# Patient Record
Sex: MALE | Race: WHITE | NOT HISPANIC OR LATINO | Employment: FULL TIME | URBAN - METROPOLITAN AREA
[De-identification: names, ages, dates, MRNs, and addresses within clinical notes are randomized per-mention and may not be internally consistent; named-entity substitution may affect disease eponyms.]

---

## 2018-03-30 ENCOUNTER — OFFICE VISIT (OUTPATIENT)
Dept: URGENT CARE | Facility: URGENT CARE | Age: 23
End: 2018-03-30
Payer: MEDICARE

## 2018-03-30 VITALS
BODY MASS INDEX: 29.64 KG/M2 | HEART RATE: 67 BPM | OXYGEN SATURATION: 100 % | DIASTOLIC BLOOD PRESSURE: 66 MMHG | TEMPERATURE: 96.5 F | SYSTOLIC BLOOD PRESSURE: 137 MMHG | RESPIRATION RATE: 18 BRPM | WEIGHT: 206.6 LBS

## 2018-03-30 DIAGNOSIS — R21 RASH AND NONSPECIFIC SKIN ERUPTION: Primary | ICD-10-CM

## 2018-03-30 PROCEDURE — 99203 OFFICE O/P NEW LOW 30 MIN: CPT | Performed by: NURSE PRACTITIONER

## 2018-03-30 NOTE — PATIENT INSTRUCTIONS
Hydrocortisone to area    Benadryl and/or Zyrtec as needed for itching    Follow up if symptoms do not improve or worsen.

## 2018-03-30 NOTE — PROGRESS NOTES
SUBJECTIVE:   Savage Roberts is a 23 year old male who presents to clinic today for the following health issues:    Rash  Onset: Today     Description:   Location: Arms and all over body   Character: raised, painful, burning, red  Itching (Pruritis): YES    Progression of Symptoms:  improving    Accompanying Signs & Symptoms:  Fever: no   Body aches or joint pain: no   Sore throat symptoms: no   Recent cold symptoms: YES- runny nose and dry throat last couple days     History:   Previous similar rash: no     Precipitating factors:   Exposure to similar rash: no   New exposures: None   Recent travel: no     Alleviating factors:  None     Therapies Tried and outcome: None     Eat at a diner this afternoon, rash on elbows started after that. Improving since then.  1 spot on abdomen, 1 on back and 1 on thigh-resolving    Problem list and histories reviewed & adjusted, as indicated.  Additional history: as documented    Patient Active Problem List   Diagnosis     Duane's syndrome     Past Surgical History:   Procedure Laterality Date     NISSEN FUNDOPLICATION  1/2010     RECESSION RESECTION (REPAIR STRABISMUS) BILATERAL Bilateral 1/20/2015    Procedure: RECESSION RESECTION (REPAIR STRABISMUS) BILATERAL;  Surgeon: Shaun Storey MD;  Location: [facility]     SEPTOPLASTY  12/2011     STRABISMUS SURGERY  6/27/13    RLRc 5 and LLRc 4mm ([facility])       Social History   Substance Use Topics     Smoking status: Passive Smoke Exposure - Never Smoker     Smokeless tobacco: Never Used     Alcohol use No     Family History   Problem Relation Age of Onset     Strabismus Maternal Grandmother      no sx, no glasses, no patching     Strabismus Other      strab sx, glasses/strab sx     Neurologic Disorder Mother      nystagmus, bipolar, narcolepsy         Current Outpatient Prescriptions   Medication Sig Dispense Refill     Acetaminophen (TYLENOL PO) Take 1,000 mg by mouth every 6 hours as needed for mild pain or fever        BusPIRone HCl (BUSPAR PO) Take 15 mg by mouth 2 times daily       Butalbital-Acetaminophen (PHRENILIN)  MG TABS Take 1 tablet by mouth May take 2 times daily as needed       Levothyroxine Sodium (LEVOTHROID PO) Take 88 mg by mouth daily       divalproex (DEPAKOTE ER) 500 MG 24 hr tablet Take 500 mg by mouth daily       PROPRANOLOL HCL PO Take 60 mg by mouth 3 times daily as needed Take 1/2 tablet 3 times daily       ARIPiprazole (ABILIFY PO) Take 20 mg by mouth daily        Allergies   Allergen Reactions     Dust Mites      Labs reviewed in EPIC    Reviewed and updated as needed this visit by clinical staff       Reviewed and updated as needed this visit by Provider         ROS:  Constitutional, HEENT, cardiovascular, pulmonary, gi and gu systems are negative, except as otherwise noted.    OBJECTIVE:     /66 (BP Location: Right arm, Cuff Size: Adult Large)  Pulse 67  Temp 96.5  F (35.8  C) (Tympanic)  Resp 18  Wt 206 lb 9.6 oz (93.7 kg)  SpO2 100%  BMI 29.64 kg/m2  Body mass index is 29.64 kg/(m^2).  GENERAL: healthy, alert and no distress  NECK: no adenopathy  RESP: lungs clear to auscultation - no rales, rhonchi or wheezes  CV: regular rate and rhythm, normal S1 S2, no S3 or S4, no murmur, click or rub  SKIN: mild erythema on left elbow and left medial thigh  PSYCH: mentation appears normal, affect normal/bright    Diagnostic Test Results:  none     ASSESSMENT/PLAN:     1. Rash and nonspecific skin eruption  No acute distress.  Symptoms improving/resolved.  Symptomatic care and follow up discussed.    Home care instructions were reviewed with the patient. The risks, benefits and treatment options of prescribed medications or other treatments have been discussed with the patient. The patient verbalized their understanding and should call or follow up if no improvement or if they develop further problems.    Patient Instructions   Hydrocortisone to area    Benadryl and/or Zyrtec as needed for  itching    Follow up if symptoms do not improve or worsen.        AUDELIA Vasquez [facility]

## 2018-03-30 NOTE — MR AVS SNAPSHOT
"              After Visit Summary   3/30/2018    Savage Roberts    MRN: [id number]           Patient Information     Date Of Birth          1995        Visit Information        Provider Department      3/30/2018 5:05 PM Teodora Martinez APRN CNP [facility]        Care Instructions    Hydrocortisone to area    Benadryl and/or Zyrtec as needed for itching    Follow up if symptoms do not improve or worsen.            Follow-ups after your visit        Who to contact     If you have questions or need follow up information about today's clinic visit or your schedule please contact [facility] directly at [phone].  Normal or non-critical lab and imaging results will be communicated to you by MyChart, letter or phone within 4 business days after the clinic has received the results. If you do not hear from us within 7 days, please contact the clinic through [employer] or phone. If you have a critical or abnormal lab result, we will notify you by phone as soon as possible.  Submit refill requests through [employer] or call your pharmacy and they will forward the refill request to us. Please allow 3 business days for your refill to be completed.          Additional Information About Your Visit        MyChart Information     [employer] lets you send messages to your doctor, view your test results, renew your prescriptions, schedule appointments and more. To sign up, go to [email or web address] . Click on \"Log in\" on the left side of the screen, which will take you to the Welcome page. Then click on \"Sign up Now\" on the right side of the page.     You will be asked to enter the access code listed below, as well as some personal information. Please follow the directions to create your username and password.     Your access code is: [email or web address]  Expires: 2018  5:29 PM     Your access code will  in 90 days. If you need help or a new code, " please call your [place] clinic or [phone].        Care EveryWhere ID     This is your Care EveryWhere ID. This could be used by other organizations to access your [place] medical records  [id number]        Your Vitals Were     Pulse Temperature Respirations Pulse Oximetry BMI (Body Mass Index)       67 96.5  F (35.8  C) (Tympanic) 18 100% 29.64 kg/m2        Blood Pressure from Last 3 Encounters:   03/30/18 137/66   01/20/15 109/52    Weight from Last 3 Encounters:   03/30/18 206 lb 9.6 oz (93.7 kg)   01/20/15 238 lb 5.1 oz (108.1 kg) (99 %)*     * Growth percentiles are based on CDC 2-20 Years data.              Today, you had the following     No orders found for display       Primary Care Provider Fax #    Physician No Ref-Primary [phone]       No address on file        Equal Access to Services     [facility]: Hadii sarabjit Rosales, waaxda leoncio, qaybta kaalmada diane, isaiah barahona . So [facility] [phone].    ATENCIÓN: Si habla español, tiene a addison disposición servicios gratuitos de asistencia lingüística. Llame al [phone].    We comply with applicable federal civil rights laws and [place] laws. We do not discriminate on the basis of race, color, national origin, age, disability, sex, sexual orientation, or gender identity.            Thank you!     Thank you for choosing [facility]  for your care. Our goal is always to provide you with excellent care. Hearing back from our patients is one way we can continue to improve our services. Please take a few minutes to complete the written survey that you may receive in the mail after your visit with us. Thank you!             Your Updated Medication List - Protect others around you: Learn how to safely use, store and throw away your medicines at www.disposemymeds.org.          This list is accurate as of 3/30/18  5:29 PM.  Always use your most recent med list.                    Brand Name Dispense Instructions for use Diagnosis    ABILIFY PO      Take 20 mg by mouth daily        BUSPAR PO      Take 15 mg by mouth 2 times daily        Butalbital-Acetaminophen  MG Tabs per tablet    PHRENILIN     Take 1 tablet by mouth May take 2 times daily as needed        divalproex sodium extended-release 500 MG 24 hr tablet    DEPAKOTE ER     Take 500 mg by mouth daily        HYDROcodone-acetaminophen 5-325 MG per tablet    NORCO    10 tablet    Take 1 tablet by mouth every 6 hours as needed for moderate to severe pain Please feel free to use this medication for eye pain. Feel free to supplement or transition to over the counter dosing of Motrin (or Ibuprofen). Do not drive while under the influence of this medication.    Duane's syndrome       LEVOTHROID PO      Take 88 mg by mouth daily        neomycin-polymyxin-dexamethasone [id number] Susp ophthalmic susp    MAXITROL    1 Bottle    Apply 1 drop to eye 2 times daily Use on surgical eye    Duane's syndrome       PROPRANOLOL HCL PO      Take 60 mg by mouth 3 times daily as needed Take 1/2 tablet 3 times daily        TYLENOL PO      Take 1,000 mg by mouth every 6 hours as needed for mild pain or fever

## 2018-03-30 NOTE — NURSING NOTE
"Chief Complaint   Patient presents with     Derm Problem       Initial /66 (BP Location: Right arm, Cuff Size: Adult Large)  Pulse 67  Temp 96.5  F (35.8  C) (Tympanic)  Resp 18  Wt 206 lb 9.6 oz (93.7 kg)  SpO2 100%  BMI 29.64 kg/m2 Estimated body mass index is 29.64 kg/(m^2) as calculated from the following:    Height as of 1/20/15: 5' 10\" (1.778 m).    Weight as of this encounter: 206 lb 9.6 oz (93.7 kg).      Health Maintenance that is potentially due pending provider review:  NONE    n/a    Is there anyone who you would like to be able to receive your results? Not Applicable  If yes have patient fill out PETRA    Avinash Barrera M.A.      "

## 2018-09-18 ENCOUNTER — HOSPITAL ENCOUNTER (EMERGENCY)
Facility: CLINIC | Age: 23
Discharge: HOME | End: 2018-09-18
Attending: PHYSICIAN ASSISTANT | Admitting: PHYSICIAN ASSISTANT
Payer: MEDICARE

## 2018-09-18 ENCOUNTER — APPOINTMENT (OUTPATIENT)
Dept: GENERAL RADIOLOGY | Facility: CLINIC | Age: 23
End: 2018-09-18
Attending: PHYSICIAN ASSISTANT
Payer: MEDICARE

## 2018-09-18 VITALS
DIASTOLIC BLOOD PRESSURE: 85 MMHG | RESPIRATION RATE: 16 BRPM | TEMPERATURE: 98.8 F | BODY MASS INDEX: 28.7 KG/M2 | SYSTOLIC BLOOD PRESSURE: 142 MMHG | OXYGEN SATURATION: 98 % | HEART RATE: 69 BPM | WEIGHT: 200 LBS

## 2018-09-18 DIAGNOSIS — K59.00 CONSTIPATION, UNSPECIFIED CONSTIPATION TYPE: ICD-10-CM

## 2018-09-18 DIAGNOSIS — R07.81 RIB PAIN ON LEFT SIDE: ICD-10-CM

## 2018-09-18 PROCEDURE — 71101 X-RAY EXAM UNILAT RIBS/CHEST: CPT | Mod: LT

## 2018-09-18 PROCEDURE — 99214 OFFICE O/P EST MOD 30 MIN: CPT | Mod: Z6 | Performed by: PHYSICIAN ASSISTANT

## 2018-09-18 PROCEDURE — G0463 HOSPITAL OUTPT CLINIC VISIT: HCPCS | Mod: 25 | Performed by: PHYSICIAN ASSISTANT

## 2018-09-18 PROCEDURE — 72070 X-RAY EXAM THORAC SPINE 2VWS: CPT

## 2018-09-18 RX ORDER — CYCLOBENZAPRINE HCL 10 MG
10 TABLET ORAL 2 TIMES DAILY PRN
Qty: 12 TABLET | Refills: 0 | Status: SHIPPED | OUTPATIENT
Start: 2018-09-18 | End: 2019-09-10

## 2018-09-18 ASSESSMENT — ENCOUNTER SYMPTOMS
BLOOD IN STOOL: 0
SHORTNESS OF BREATH: 0
PALPITATIONS: 0
DIARRHEA: 0
WEAKNESS: 0
HEADACHES: 0
FEVER: 0
WHEEZING: 0
COLOR CHANGE: 0
WOUND: 0
NUMBNESS: 0
COUGH: 0
FATIGUE: 0
ABDOMINAL PAIN: 0
CONSTIPATION: 1
BACK PAIN: 1
VOMITING: 0
NAUSEA: 0

## 2018-09-18 NOTE — ED AVS SNAPSHOT
[facility]    [address] [facility] [address]    Phone:  [phone]    Fax:  [phone]                                       Savage Roberts   MRN: [id number]    Department:  [facility]   Date of Visit:  9/18/2018           After Visit Summary Signature Page     I have received my discharge instructions, and my questions have been answered. I have discussed any challenges I see with this plan with the nurse or doctor.    ..........................................................................................................................................  Patient/Patient Representative Signature      ..........................................................................................................................................  Patient Representative Print Name and Relationship to Patient    ..................................................               ................................................  Date                                   Time    ..........................................................................................................................................  Reviewed by Signature/Title    ...................................................              ..............................................  Date                                               Time          22EPIC Rev 08/18
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
## 2018-09-18 NOTE — DISCHARGE INSTRUCTIONS
Patient to use heat/ice, rest, tylenol/ibuprofen over the counter as needed for pain.     No heavy lifting for next few days.  Patient to take 1 capful of miralax daily for next several days until bowel movements return to normal.     Patient to increase fluids and fiber.     Patient to return to Emergency Room if shortness of breath, chest pain, abdominal pain, nausea/vomiting occur.
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
## 2018-11-30 ENCOUNTER — RADIANT APPOINTMENT (OUTPATIENT)
Dept: GENERAL RADIOLOGY | Facility: CLINIC | Age: 23
End: 2018-11-30
Attending: PHYSICIAN ASSISTANT
Payer: MEDICARE

## 2018-11-30 ENCOUNTER — OFFICE VISIT (OUTPATIENT)
Dept: FAMILY MEDICINE | Facility: CLINIC | Age: 23
End: 2018-11-30
Payer: MEDICARE

## 2018-11-30 VITALS
HEIGHT: 70 IN | HEART RATE: 75 BPM | WEIGHT: 217 LBS | RESPIRATION RATE: 16 BRPM | DIASTOLIC BLOOD PRESSURE: 79 MMHG | BODY MASS INDEX: 31.07 KG/M2 | SYSTOLIC BLOOD PRESSURE: 132 MMHG | TEMPERATURE: 98 F

## 2018-11-30 DIAGNOSIS — Z23 NEED FOR PROPHYLACTIC VACCINATION AND INOCULATION AGAINST INFLUENZA: ICD-10-CM

## 2018-11-30 DIAGNOSIS — S42.102D CLOSED FRACTURE OF LEFT SCAPULA WITH ROUTINE HEALING, UNSPECIFIED PART OF SCAPULA, SUBSEQUENT ENCOUNTER: Primary | ICD-10-CM

## 2018-11-30 DIAGNOSIS — Z23 NEED FOR TDAP VACCINATION: ICD-10-CM

## 2018-11-30 DIAGNOSIS — Z71.85 VACCINE COUNSELING: ICD-10-CM

## 2018-11-30 DIAGNOSIS — S42.102D CLOSED FRACTURE OF LEFT SCAPULA WITH ROUTINE HEALING, UNSPECIFIED PART OF SCAPULA, SUBSEQUENT ENCOUNTER: ICD-10-CM

## 2018-11-30 PROCEDURE — 90715 TDAP VACCINE 7 YRS/> IM: CPT | Performed by: PHYSICIAN ASSISTANT

## 2018-11-30 PROCEDURE — 90471 IMMUNIZATION ADMIN: CPT | Performed by: PHYSICIAN ASSISTANT

## 2018-11-30 PROCEDURE — 73000 X-RAY EXAM OF COLLAR BONE: CPT | Mod: LT

## 2018-11-30 PROCEDURE — 90686 IIV4 VACC NO PRSV 0.5 ML IM: CPT | Performed by: PHYSICIAN ASSISTANT

## 2018-11-30 PROCEDURE — 99213 OFFICE O/P EST LOW 20 MIN: CPT | Mod: 25 | Performed by: PHYSICIAN ASSISTANT

## 2018-11-30 PROCEDURE — G0008 ADMIN INFLUENZA VIRUS VAC: HCPCS | Mod: 59 | Performed by: PHYSICIAN ASSISTANT

## 2018-11-30 NOTE — PROGRESS NOTES
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
## 2018-11-30 NOTE — NURSING NOTE
"Chief Complaint   Patient presents with     Musculoskeletal Problem       Initial /79 (BP Location: Right arm, Patient Position: Chair, Cuff Size: Adult Large)  Pulse 75  Temp 98  F (36.7  C) (Tympanic)  Resp 16  Ht 5' 10\" (1.778 m)  Wt 217 lb (98.4 kg)  BMI 31.14 kg/m2 Estimated body mass index is 31.14 kg/(m^2) as calculated from the following:    Height as of this encounter: 5' 10\" (1.778 m).    Weight as of this encounter: 217 lb (98.4 kg).    Patient presents to the clinic using No DME    Health Maintenance that is potentially due pending provider review:          Is there anyone who you would like to be able to receive your results? No  If yes have patient fill out PETRA      "

## 2018-11-30 NOTE — PATIENT INSTRUCTIONS
Xray today     Get flu and tetanus shot today     Decided to think about gardasil (HPV) vaccine    Gardasil (Human papilloma virus vaccine) - to protect against sexually transmitted virus which causes warts and cancers of vagina, cervix, penis, anus, and head/neck.      Facts about HPV:  Every year in the [place], HPV causes:   -11,500 cases cervical cancer, 80% of which would have been prevented by the vaccine  - 7,400 cases mouth/throat cancer, 95% of which would have been prevented by the vaccine  -400 cases penile caner, 75% of which would have been prevented by the vaccine    The vaccine has received HPV vaccine-type infection rates by 56% since the vaccine was first started.    Studies show vaccine does not influence teens to have sex earlier.  Still, half of teens start having sex by age 15.  Vaccine can only protect from future (not past) infections, therefore ideally needs to be given before sexual activity.  Vaccine protection lasts a lifetime.    Additionally:  Immune system response is better in pre-teens than in older teens.  If started at later age, vaccine requires an additional dose (3 rather than 2 doses).  We don't see teens often enough to guarantee completing the series if we do not start vaccine right away.        And call the clinic if you have questions

## 2018-11-30 NOTE — MR AVS SNAPSHOT
After Visit Summary   11/30/2018    Savage Roberts    MRN: [id number]           Patient Information     Date Of Birth          1995        Visit Information        Provider Department      11/30/2018 2:40 PM Valarie Claudio PA-C [facility]        Today's Diagnoses     Closed fracture of left scapula with routine healing, unspecified part of scapula, subsequent encounter    -  1    Vaccine counseling          Care Instructions    Xray today     Get flu and tetanus shot today     Decided to think about gardasil (HPV) vaccine    Menactra - to protect against bacterial meningitis - deadly and can kill within 24 hours.  Recommendations are for vaccine at age 11-12 and then a booster at/after age 16.  Can be given up to age 21.  Highest risk for meningitis is in teen and later teen years.  Symptoms of meningitis include severe headache, neck stiffness, fever, and altered mental function.  Even if vaccinated, always be seen immediately if these symptoms are present.      Gardasil (Human papilloma virus vaccine) - to protect against sexually transmitted virus which causes warts and cancers of vagina, cervix, penis, anus, and head/neck.      Facts about HPV:  Every year in the [place], HPV causes:   -11,500 cases cervical cancer, 80% of which would have been prevented by the vaccine  - 7,400 cases mouth/throat cancer, 95% of which would have been prevented by the vaccine  -400 cases penile caner, 75% of which would have been prevented by the vaccine    The vaccine has received HPV vaccine-type infection rates by 56% since the vaccine was first started.    Studies show vaccine does not influence teens to have sex earlier.  Still, half of teens start having sex by age 15.  Vaccine can only protect from future (not past) infections, therefore ideally needs to be given before sexual activity.  Vaccine protection lasts a lifetime.    Additionally:  Immune system response is better  "in pre-teens than in older teens.  If started at later age, vaccine requires an additional dose (3 rather than 2 doses).  We don't see teens often enough to guarantee completing the series if we do not start vaccine right away.        And call the clinic if you have questions              Follow-ups after your visit        Future tests that were ordered for you today     Open Future Orders        Priority Expected Expires Ordered    XR Scapula Left Routine 11/30/2018 11/30/2019 11/30/2018            Who to contact     If you have questions or need follow up information about today's clinic visit or your schedule please contact [facility] directly at [phone].  Normal or non-critical lab and imaging results will be communicated to you by MyChart, letter or phone within 4 business days after the clinic has received the results. If you do not hear from us within 7 days, please contact the clinic through MyChart or phone. If you have a critical or abnormal lab result, we will notify you by phone as soon as possible.  Submit refill requests through [employer] or call your pharmacy and they will forward the refill request to us. Please allow 3 business days for your refill to be completed.          Additional Information About Your Visit        Care EveryWhere ID     This is your Care EveryWhere ID. This could be used by other organizations to access your [facility] medical records  [id number]        Your Vitals Were     Pulse Temperature Respirations Height BMI (Body Mass Index)       75 98  F (36.7  C) (Tympanic) 16 5' 10\" (1.778 m) 31.14 kg/m2        Blood Pressure from Last 3 Encounters:   11/30/18 132/79   09/18/18 142/85   03/30/18 137/66    Weight from Last 3 Encounters:   11/30/18 217 lb (98.4 kg)   09/18/18 200 lb (90.7 kg)   03/30/18 206 lb 9.6 oz (93.7 kg)               Primary Care Provider Fax #    Physician No Ref-Primary [phone]       No address on file        Equal Access to " Services     [facility]: Hadii sarabjit Rosales, waerrolda luqadaha, qaybta kaalmateddy contreras, isaiah nesbitt. So [facility] [phone].    ATENCIÓN: Si habla español, tiene a addison disposición servicios gratuitos de asistencia lingüística. Llame al [phone].    We comply with applicable federal civil rights laws and [place] laws. We do not discriminate on the basis of race, color, national origin, age, disability, sex, sexual orientation, or gender identity.            Thank you!     Thank you for choosing [facility]  for your care. Our goal is always to provide you with excellent care. Hearing back from our patients is one way we can continue to improve our services. Please take a few minutes to complete the written survey that you may receive in the mail after your visit with us. Thank you!             Your Updated Medication List - Protect others around you: Learn how to safely use, store and throw away your medicines at www.disposemymeds.org.          This list is accurate as of 11/30/18  4:03 PM.  Always use your most recent med list.                   Brand Name Dispense Instructions for use Diagnosis    cyclobenzaprine 10 MG tablet    FLEXERIL    12 tablet    Take 1 tablet (10 mg) by mouth 2 times daily as needed for muscle spasms

## 2019-09-10 ENCOUNTER — OFFICE VISIT (OUTPATIENT)
Dept: FAMILY MEDICINE | Facility: CLINIC | Age: 24
End: 2019-09-10
Payer: MEDICARE

## 2019-09-10 VITALS
OXYGEN SATURATION: 98 % | HEART RATE: 64 BPM | HEIGHT: 71 IN | TEMPERATURE: 98.6 F | RESPIRATION RATE: 14 BRPM | BODY MASS INDEX: 31.02 KG/M2 | WEIGHT: 221.6 LBS | SYSTOLIC BLOOD PRESSURE: 128 MMHG | DIASTOLIC BLOOD PRESSURE: 76 MMHG

## 2019-09-10 DIAGNOSIS — R00.2 PALPITATIONS: ICD-10-CM

## 2019-09-10 DIAGNOSIS — R40.4 TRANSIENT ALTERATION OF AWARENESS: Primary | ICD-10-CM

## 2019-09-10 DIAGNOSIS — E66.9 NON MORBID OBESITY, UNSPECIFIED OBESITY TYPE: ICD-10-CM

## 2019-09-10 DIAGNOSIS — R71.8 MICROCYTOSIS: ICD-10-CM

## 2019-09-10 DIAGNOSIS — R71.8 LOW MEAN CORPUSCULAR VOLUME (MCV): ICD-10-CM

## 2019-09-10 DIAGNOSIS — Z87.820 HISTORY OF MULTIPLE CONCUSSIONS: ICD-10-CM

## 2019-09-10 LAB
ALBUMIN SERPL-MCNC: 4 G/DL (ref 3.4–5)
ALP SERPL-CCNC: 75 U/L (ref 40–150)
ALT SERPL W P-5'-P-CCNC: 42 U/L (ref 0–70)
ANION GAP SERPL CALCULATED.3IONS-SCNC: 6 MMOL/L (ref 3–14)
AST SERPL W P-5'-P-CCNC: 24 U/L (ref 0–45)
BASOPHILS # BLD AUTO: 0 10E9/L (ref 0–0.2)
BASOPHILS NFR BLD AUTO: 0.5 %
BILIRUB SERPL-MCNC: 0.4 MG/DL (ref 0.2–1.3)
BUN SERPL-MCNC: 11 MG/DL (ref 7–30)
CALCIUM SERPL-MCNC: 8.7 MG/DL (ref 8.5–10.1)
CHLORIDE SERPL-SCNC: 105 MMOL/L (ref 94–109)
CO2 SERPL-SCNC: 26 MMOL/L (ref 20–32)
CREAT SERPL-MCNC: 0.92 MG/DL (ref 0.66–1.25)
CRP SERPL-MCNC: <2.9 MG/L (ref 0–8)
DIFFERENTIAL METHOD BLD: ABNORMAL
EOSINOPHIL # BLD AUTO: 0 10E9/L (ref 0–0.7)
EOSINOPHIL NFR BLD AUTO: 0.2 %
ERYTHROCYTE [DISTWIDTH] IN BLOOD BY AUTOMATED COUNT: 13 % (ref 10–15)
ERYTHROCYTE [SEDIMENTATION RATE] IN BLOOD BY WESTERGREN METHOD: 5 MM/H (ref 0–15)
GFR SERPL CREATININE-BSD FRML MDRD: >90 ML/MIN/{1.73_M2}
GLUCOSE SERPL-MCNC: 85 MG/DL (ref 70–99)
HCT VFR BLD AUTO: 42.6 % (ref 40–53)
HGB BLD-MCNC: 14.9 G/DL (ref 13.3–17.7)
LYMPHOCYTES # BLD AUTO: 1.5 10E9/L (ref 0.8–5.3)
LYMPHOCYTES NFR BLD AUTO: 35 %
MCH RBC QN AUTO: 26.6 PG (ref 26.5–33)
MCHC RBC AUTO-ENTMCNC: 35 G/DL (ref 31.5–36.5)
MCV RBC AUTO: 76 FL (ref 78–100)
MONOCYTES # BLD AUTO: 0.4 10E9/L (ref 0–1.3)
MONOCYTES NFR BLD AUTO: 8.2 %
NEUTROPHILS # BLD AUTO: 2.5 10E9/L (ref 1.6–8.3)
NEUTROPHILS NFR BLD AUTO: 56.1 %
PLATELET # BLD AUTO: 210 10E9/L (ref 150–450)
POTASSIUM SERPL-SCNC: 3.9 MMOL/L (ref 3.4–5.3)
PROT SERPL-MCNC: 7.5 G/DL (ref 6.8–8.8)
RBC # BLD AUTO: 5.61 10E12/L (ref 4.4–5.9)
SODIUM SERPL-SCNC: 137 MMOL/L (ref 133–144)
TSH SERPL DL<=0.005 MIU/L-ACNC: 2.25 MU/L (ref 0.4–4)
VIT B12 SERPL-MCNC: 445 PG/ML (ref 193–986)
WBC # BLD AUTO: 4.4 10E9/L (ref 4–11)

## 2019-09-10 PROCEDURE — 86140 C-REACTIVE PROTEIN: CPT | Performed by: FAMILY MEDICINE

## 2019-09-10 PROCEDURE — 85025 COMPLETE CBC W/AUTO DIFF WBC: CPT | Performed by: FAMILY MEDICINE

## 2019-09-10 PROCEDURE — 80053 COMPREHEN METABOLIC PANEL: CPT | Performed by: FAMILY MEDICINE

## 2019-09-10 PROCEDURE — 84443 ASSAY THYROID STIM HORMONE: CPT | Performed by: FAMILY MEDICINE

## 2019-09-10 PROCEDURE — 99214 OFFICE O/P EST MOD 30 MIN: CPT | Performed by: FAMILY MEDICINE

## 2019-09-10 PROCEDURE — 85652 RBC SED RATE AUTOMATED: CPT | Performed by: FAMILY MEDICINE

## 2019-09-10 PROCEDURE — 82607 VITAMIN B-12: CPT | Performed by: FAMILY MEDICINE

## 2019-09-10 PROCEDURE — 36415 COLL VENOUS BLD VENIPUNCTURE: CPT | Performed by: FAMILY MEDICINE

## 2019-09-10 PROCEDURE — 93000 ELECTROCARDIOGRAM COMPLETE: CPT | Performed by: FAMILY MEDICINE

## 2019-09-10 ASSESSMENT — MIFFLIN-ST. JEOR: SCORE: 2013.33

## 2019-09-10 NOTE — PATIENT INSTRUCTIONS
"Contact Cardiac Services  at [phone], to schedule the zio patch placement appointment.    You will be contacted in 1-2 days for results of your lab tests.    To schedule the MRI of the brain.    Schedule neurology consult.  Patient Education     Heart Palpitations    Palpitations are the feeling that your heart is beating hard, fast, or irregular. Some describe it as \"pounding\" or \"skipped beats.\" Palpitations may occur in someone with heart disease, but can also occur in a healthy person.  Heart-related causes:    Arrhythmia (a change from the heart's normal rhythm)    Heart valve disease    Disease of the heart muscle    Coronary artery disease    High blood pressure  Non-heart-related causes:    Certain medicines such as asthma inhalers and decongestants    Some herbal supplements, energy drinks and pills, and weight loss pills    Illegal stimulant drugs such as cocaine, crank, methamphetamine, PCP, bath salts, or ecstasy    Caffeine, alcohol, and tobacco    Medical conditions such as thyroid disease, anemia, anxiety, and panic disorder  Sometimes the cause can't be found.  Home care  Follow these home care tips:    Don't use too much caffeine, alcohol, tobacco, or any stimulant drugs.    Tell your doctor about any prescription or over-the-counter or herbal medicines you take.  Follow-up care    Follow up with your doctor, or as advised.  Call 911  This is the fastest and safest way to get to the emergency department. The paramedics can also begin treatment on the way to the hospital, if needed.  Don't wait until your symptoms are severe to call 911. These are reasons to call 911:    Chest pain    Shortness of breath    Feeling lightheaded, faint, or dizzy    Fainting or loss of consciousness    Very irregular heartbeat    Rapid heartbeat that makes you uncomfortable    Slower than usual heart rate associated with symptoms    Slower than usual heart rate    Chest pain with weakness, dizziness, heavy " sweating, nausea, or vomiting    Extreme drowsiness or confusion    Weakness of an arm or leg, or on 1 side of the face    Difficulty with speech or vision  When to seek medical advice  Call your healthcare provider right away if you have palpitations and any of the following:    Weakness    Dizziness    Lightheadedness    Fainting  Date Last Reviewed: 4/27/2016 2000-2018 [employer]. [address] [place] [address]. All rights reserved. This information is not intended as a substitute for professional medical care. Always follow your healthcare professional's instructions.           Patient Education     Altered Level of Consciousness  Level of consciousness (LOC) is a measure of a person s ability to interact with other people and to react to what is around them. A person with an altered level of consciousness may not respond to touch or voices. They may look vacant or blank. They may not make eye contact with others. The person may be limp and may not move for a long time. Or they may show little interest in moving. He or she may also be confused.  There are many causes of altered LOC. They include low blood sugar, infection, medicines, injuries, seizures, stroke, being drunk or other health problems.  Altered LOC is a medical emergency. The healthcare provider will do tests to help find the cause. These may include blood tests and imaging tests. The person is treated so breathing and heart rate are stable. An An IV (intravenous) line may be put into a vein in the arm or hand to give medicines. Once the cause of altered LOC is found, the goal is to treat the cause.  In almost all cases, the person will be admitted to the hospital for diagnostic testing and observation.  Some conditions, such as locked-in syndrome and akinetic mutism, seem like a coma. But the person is perfectly awake.  Home care  When your loved one is released from the hospital, you will be given guidelines for caring  for him or her. In general:    Follow the healthcare provider's instructions for giving any prescribed medicines to your child.    Stay with your loved one or have another responsible adult look after him or her. Watch carefully for any return of symptoms or changes in behavior.    If the person has diabetes, make sure that any approved medicines are given on time and as prescribed.  Follow-up care  Follow up with your healthcare provider, or our staff as advised.  When to seek medical advice  Call your healthcare provider right away if new symptoms appear.  Call 911  Call 911 or get medical care right away Iif symptoms of altered LOC return.  Date Last Reviewed: 6/1/2018 2000-2018 [employer]. [address] [place] [address], [place] [address]. All rights reserved. This information is not intended as a substitute for professional medical care. Always follow your healthcare professional's instructions.

## 2019-09-10 NOTE — PROGRESS NOTES
Subjective     Savage Roberts is a 24 year old male who presents to clinic today for the following health issues:  Chief Complaint   Patient presents with     Establish Care     Dizziness     Pt also having random shakiness and dizziness, would like to have some labs done for hypoglycemia.     Blood Draw     Pt is fasting for lab work.       HPI   Dizziness, shakiness  Onset: pt remembers all through high school through current, worse past couple months    Description:   Do you feel faint:  YES- feels lightheaded like he might if he does not lay down   Does it feel like the surroundings (bed, room) are moving: sometimes but not always  Unsteady/off balance: YES  Have you passed out or fallen: no     Intensity: mild    Progression of Symptoms:  worsening, happens daily recently, 1 episode per day, closer to evening    Accompanying Signs & Symptoms:  Heart palpitations: YES- sometimes irregular and racing   Nausea, vomiting: yes, when headache occurs   Weakness in arms or legs: YES- weakness in arms, shaking in arms  Fatigue: YES  Vision or speech changes: YES- speech slows down, vision is effected when he gets a headache which sometimes happens when these symptoms occur  Ringing in ears (Tinnitus): YES- pt has tinnitus  Hearing Loss: no     History:   Head trauma/concussion hx: YES- 3 concussions, most recent 5/2018, (4 [employer] accident-was unconscious for 3 days) collarbone fractured-had surgery for that  Previous similar symptoms: YES- ongoing since high school  Recent bleeding history: no     Precipitating factors:   Worse with activity or head movement: unsure   Any new medications (BP?): no   Alcohol/drug abuse/withdrawal: no     Alleviating factors:   Does staying in a fixed position give relief:  no     Therapies Tried and outcome: none    Verified above history with patient.  Patient is worried he has diabetes because he said he found his symptoms on a Google search.    When asked further patient  "describes his symptoms as more of spacing out for several seconds (\"like my mind I shere and my body is separate\"), feeling he has no control of either arm with each episode, and he feels a headache after the episode.  Patient reports intermittent irregular pulse as well every day the last 7 days.    Denies any of these symptoms currently occurring at visit.    Patient recalls when he was a child, he was brought to heart and neurology specialists for possible seizures and \"heart problems\" but no abnormality he can recall. Patient is not on any chronic medications.    Patient denies actual LOC. Patient denies MVA due to the above.    Patient Active Problem List   Diagnosis     Duane's syndrome     Past Surgical History:   Procedure Laterality Date     NISSEN FUNDOPLICATION  1/2010     RECESSION RESECTION (REPAIR STRABISMUS) BILATERAL Bilateral 1/20/2015    Procedure: RECESSION RESECTION (REPAIR STRABISMUS) BILATERAL;  Surgeon: Shaun Storey MD;  Location: [facility]     SEPTOPLASTY  12/2011     STRABISMUS SURGERY  6/27/13    RLRc 5 and LLRc 4mm (Daniel)       Social History     Tobacco Use     Smoking status: Never Smoker     Smokeless tobacco: Never Used   Substance Use Topics     Alcohol use: No     Family History   Problem Relation Age of Onset     Strabismus Maternal Grandmother         no sx, no glasses, no patching     Strabismus Other         strab sx, glasses/strab sx     Neurologic Disorder Mother         nystagmus, bipolar, narcolepsy     Multiple Sclerosis Maternal Grandfather      Multiple Sclerosis Maternal Uncle          No current outpatient medications on file.     Allergies   Allergen Reactions     Dust Mites        Reviewed and updated as needed this visit by Provider  Tobacco  Allergies  Meds  Problems  Med Hx  Surg Hx  Fam Hx         Review of Systems   ROS COMP: Constitutional, HEENT, cardiovascular, pulmonary, GI, , musculoskeletal, neuro, skin, endocrine and psych systems are negative, " "except as otherwise noted.      Objective    /76   Pulse 64   Temp 98.6  F (37  C) (Tympanic)   Resp 14   Ht 1.797 m (5' 10.75\")   Wt 100.5 kg (221 lb 9.6 oz)   SpO2 98%   BMI 31.13 kg/m    Body mass index is 31.13 kg/m .  Physical Exam   GENERAL APPEARANCE: obese, alert and no distress  EYES: pink conj, no icterus, PERRL, EOMI  HENT: ear canals and TM's normal, nose and mouth without ulcers or lesions, no pharyngeal erythema but with grade 3 hypertrophic tonsils/adenoids bilaterally and oral mucous membranes moist  NECK: no adenopathy, no asymmetry, masses, or scars and thyroid normal to palpation  RESP: lungs clear to auscultation - no rales, rhonchi or wheezes  CV: regular rates and rhythm, normal S1 S2, no S3 or S4, no murmur, click or rub, no peripheral edema and peripheral pulses strong  ABDOMEN: soft, nontender, no hepatosplenomegaly, no masses and bowel sounds normal  MS: no musculoskeletal defects are noted and gait is age appropriate without ataxia  SKIN: no suspicious lesions or rashes  NEURO: Patient is A and O X 3; Cranial nerves 2-12 intact;  Strength 5/5 all extremities; DTR: ++ x 4; Romberg negative, able to tandem walk; Sensory grossly no deficit; no tremors No problems with motor coordination    Diagnostic Test Results:  EKG Today showed sinus bradycardia with no acute ischemic changes or premature beats        Assessment & Plan     Savage was seen today for establish care, dizziness and blood draw.    Diagnoses and all orders for this visit:    Transient alteration of awareness  -     CBC with platelets differential  -     Comprehensive metabolic panel  -     Erythrocyte sedimentation rate auto  -     Vitamin B12  -     CRP inflammation  -     MR Brain w/o Contrast; Future  -     Zio Patch Holter Adult Pediatric Greater than 48 hrs; Future  -     NEUROLOGY ADULT REFERRAL  Differentials are vast: partial/atypical/absence seizures, presyncope, cardiac dysrhythmias, intracranial " "masses, meningiomas, metabolic, postconcussion syndrome.  Discussed work up with patient. He would like to pursue more extensive work up.  Neuro consult ordered as well as possible seizure disorder - will likely need EEG if highly suspect for seizures, but will defer to neuro.    Palpitations  -     CBC with platelets differential  -     Comprehensive metabolic panel  -     TSH with free T4 reflex  -     EKG 12-lead complete w/read - Clinics  -     Zio Patch Holter Adult Pediatric Greater than 48 hrs; Future  Various possible causes. Will order work up as above. Patient concurs.  Advised to avoid or minimize caffeine.  Advised EKG with no dysrhythmia today.  Advised to work on stopping smoking.    History of multiple concussions  -     MR Brain w/o Contrast; Future  -     NEUROLOGY ADULT REFERRAL  See above.    Non morbid obesity, unspecified obesity type  Discussed risks of obesity: heart disease, stroke, end-organ damage,  DM, decreased quality of life  Counselled on diet, exercise and weight loss regimen.  Ordered CMP to screen glucose.       BMI:   Estimated body mass index is 31.13 kg/m  as calculated from the following:    Height as of this encounter: 1.797 m (5' 10.75\").    Weight as of this encounter: 100.5 kg (221 lb 9.6 oz).   Weight management plan: Discussed healthy diet and exercise guidelines        Patient Instructions   Contact Cardiac Services  at [phone], to schedule the zio patch placement appointment.    You will be contacted in 1-2 days for results of your lab tests.    To schedule the MRI of the brain.    Schedule neurology consult.  Patient Education     Heart Palpitations    Palpitations are the feeling that your heart is beating hard, fast, or irregular. Some describe it as \"pounding\" or \"skipped beats.\" Palpitations may occur in someone with heart disease, but can also occur in a healthy person.  Heart-related causes:    Arrhythmia (a change from the heart's normal " rhythm)    Heart valve disease    Disease of the heart muscle    Coronary artery disease    High blood pressure  Non-heart-related causes:    Certain medicines such as asthma inhalers and decongestants    Some herbal supplements, energy drinks and pills, and weight loss pills    Illegal stimulant drugs such as cocaine, crank, methamphetamine, PCP, bath salts, or ecstasy    Caffeine, alcohol, and tobacco    Medical conditions such as thyroid disease, anemia, anxiety, and panic disorder  Sometimes the cause can't be found.  Home care  Follow these home care tips:    Don't use too much caffeine, alcohol, tobacco, or any stimulant drugs.    Tell your doctor about any prescription or over-the-counter or herbal medicines you take.  Follow-up care    Follow up with your doctor, or as advised.  Call 911  This is the fastest and safest way to get to the emergency department. The paramedics can also begin treatment on the way to the hospital, if needed.  Don't wait until your symptoms are severe to call 911. These are reasons to call 911:    Chest pain    Shortness of breath    Feeling lightheaded, faint, or dizzy    Fainting or loss of consciousness    Very irregular heartbeat    Rapid heartbeat that makes you uncomfortable    Slower than usual heart rate associated with symptoms    Slower than usual heart rate    Chest pain with weakness, dizziness, heavy sweating, nausea, or vomiting    Extreme drowsiness or confusion    Weakness of an arm or leg, or on 1 side of the face    Difficulty with speech or vision  When to seek medical advice  Call your healthcare provider right away if you have palpitations and any of the following:    Weakness    Dizziness    Lightheadedness    Fainting  Date Last Reviewed: 4/27/2016 2000-2018 The [employer]. [address] [place] [address]. All rights reserved. This information is not intended as a substitute for professional medical care. Always follow your healthcare  professional's instructions.           Patient Education     Altered Level of Consciousness  Level of consciousness (LOC) is a measure of a person s ability to interact with other people and to react to what is around them. A person with an altered level of consciousness may not respond to touch or voices. They may look vacant or blank. They may not make eye contact with others. The person may be limp and may not move for a long time. Or they may show little interest in moving. He or she may also be confused.  There are many causes of altered LOC. They include low blood sugar, infection, medicines, injuries, seizures, stroke, being drunk or other health problems.  Altered LOC is a medical emergency. The healthcare provider will do tests to help find the cause. These may include blood tests and imaging tests. The person is treated so breathing and heart rate are stable. An An IV (intravenous) line may be put into a vein in the arm or hand to give medicines. Once the cause of altered LOC is found, the goal is to treat the cause.  In almost all cases, the person will be admitted to the hospital for diagnostic testing and observation.  Some conditions, such as locked-in syndrome and akinetic mutism, seem like a coma. But the person is perfectly awake.  Home care  When your loved one is released from the hospital, you will be given guidelines for caring for him or her. In general:    Follow the healthcare provider's instructions for giving any prescribed medicines to your child.    Stay with your loved one or have another responsible adult look after him or her. Watch carefully for any return of symptoms or changes in behavior.    If the person has diabetes, make sure that any approved medicines are given on time and as prescribed.  Follow-up care  Follow up with your healthcare provider, or our staff as advised.  When to seek medical advice  Call your healthcare provider right away if new symptoms appear.  Call  911  Call 911 or get medical care right away Iif symptoms of altered LOC return.  Date Last Reviewed: 6/1/2018 2000-2018 The [employer]. [address] [facility], [place] [address]. All rights reserved. This information is not intended as a substitute for professional medical care. Always follow your healthcare professional's instructions.               Return in about 2 weeks (around 9/24/2019) for depending on results of tests..    Galileo Fritz MD  [facility]

## 2019-09-11 DIAGNOSIS — R71.8 LOW MEAN CORPUSCULAR VOLUME (MCV): ICD-10-CM

## 2019-09-11 LAB
BASOPHILS # BLD AUTO: 0 10E9/L (ref 0–0.2)
BASOPHILS NFR BLD AUTO: 0.6 %
DIFFERENTIAL METHOD BLD: ABNORMAL
EOSINOPHIL # BLD AUTO: 0 10E9/L (ref 0–0.7)
EOSINOPHIL NFR BLD AUTO: 0 %
ERYTHROCYTE [DISTWIDTH] IN BLOOD BY AUTOMATED COUNT: 12.9 % (ref 10–15)
FERRITIN SERPL-MCNC: 98 NG/ML (ref 26–388)
HCT VFR BLD AUTO: 42.9 % (ref 40–53)
HGB BLD-MCNC: 15 G/DL (ref 13.3–17.7)
IRON SATN MFR SERPL: 38 % (ref 15–46)
IRON SERPL-MCNC: 120 UG/DL (ref 35–180)
LYMPHOCYTES # BLD AUTO: 1.9 10E9/L (ref 0.8–5.3)
LYMPHOCYTES NFR BLD AUTO: 38.2 %
MCH RBC QN AUTO: 26.5 PG (ref 26.5–33)
MCHC RBC AUTO-ENTMCNC: 35 G/DL (ref 31.5–36.5)
MCV RBC AUTO: 76 FL (ref 78–100)
MONOCYTES # BLD AUTO: 0.5 10E9/L (ref 0–1.3)
MONOCYTES NFR BLD AUTO: 9.3 %
NEUTROPHILS # BLD AUTO: 2.5 10E9/L (ref 1.6–8.3)
NEUTROPHILS NFR BLD AUTO: 51.9 %
PLATELET # BLD AUTO: 227 10E9/L (ref 150–450)
RBC # BLD AUTO: 5.65 10E12/L (ref 4.4–5.9)
RETICS # AUTO: 48.4 10E9/L (ref 25–95)
RETICS/RBC NFR AUTO: 0.9 % (ref 0.5–2)
TIBC SERPL-MCNC: 314 UG/DL (ref 240–430)
WBC # BLD AUTO: 4.8 10E9/L (ref 4–11)

## 2019-09-11 PROCEDURE — 40000847 ZZHCL STATISTIC MORPHOLOGY W/INTERP HISTOLOGY TC 85060: Performed by: FAMILY MEDICINE

## 2019-09-11 PROCEDURE — 85025 COMPLETE CBC W/AUTO DIFF WBC: CPT | Performed by: FAMILY MEDICINE

## 2019-09-11 PROCEDURE — 83540 ASSAY OF IRON: CPT | Performed by: FAMILY MEDICINE

## 2019-09-11 PROCEDURE — 82728 ASSAY OF FERRITIN: CPT | Performed by: FAMILY MEDICINE

## 2019-09-11 PROCEDURE — 85045 AUTOMATED RETICULOCYTE COUNT: CPT | Performed by: FAMILY MEDICINE

## 2019-09-11 PROCEDURE — 85060 BLOOD SMEAR INTERPRETATION: CPT | Performed by: FAMILY MEDICINE

## 2019-09-11 PROCEDURE — 36415 COLL VENOUS BLD VENIPUNCTURE: CPT | Performed by: FAMILY MEDICINE

## 2019-09-11 PROCEDURE — 83550 IRON BINDING TEST: CPT | Performed by: FAMILY MEDICINE

## 2019-09-12 ENCOUNTER — HOSPITAL ENCOUNTER (OUTPATIENT)
Dept: MRI IMAGING | Facility: CLINIC | Age: 24
Discharge: HOME | End: 2019-09-12
Attending: FAMILY MEDICINE | Admitting: FAMILY MEDICINE
Payer: MEDICARE

## 2019-09-12 DIAGNOSIS — Z87.820 HISTORY OF MULTIPLE CONCUSSIONS: ICD-10-CM

## 2019-09-12 DIAGNOSIS — R40.4 TRANSIENT ALTERATION OF AWARENESS: ICD-10-CM

## 2019-09-12 LAB — COPATH REPORT: NORMAL

## 2019-09-12 PROCEDURE — 70551 MRI BRAIN STEM W/O DYE: CPT

## 2019-09-12 NOTE — TELEPHONE ENCOUNTER
ONCOLOGY INTAKE: Records Information      APPT INFORMATION:  Referring provider:  Dr. Galileo Fritz  [facility] s [facility]  Reason for visit/diagnosis:  unexplained microcytosis; normal iron studies  Has patient been notified of appointment date and time?: yes    RECORDS INFORMATION:  Were the records received with the referral (via [employer])? No - internal referral

## 2019-09-19 ENCOUNTER — TELEPHONE (OUTPATIENT)
Dept: ONCOLOGY | Facility: CLINIC | Age: 24
End: 2019-09-19

## 2019-09-20 ENCOUNTER — PRE VISIT (OUTPATIENT)
Dept: ONCOLOGY | Facility: CLINIC | Age: 24
End: 2019-09-20

## 2019-10-02 ENCOUNTER — HOSPITAL ENCOUNTER (OUTPATIENT)
Dept: CARDIOLOGY | Facility: CLINIC | Age: 24
Discharge: HOME | End: 2019-10-02
Attending: FAMILY MEDICINE | Admitting: FAMILY MEDICINE
Payer: MEDICARE

## 2019-10-02 ENCOUNTER — ONCOLOGY VISIT (OUTPATIENT)
Dept: ONCOLOGY | Facility: CLINIC | Age: 24
End: 2019-10-02
Attending: FAMILY MEDICINE
Payer: MEDICARE

## 2019-10-02 ENCOUNTER — HOSPITAL ENCOUNTER (OUTPATIENT)
Dept: LAB | Facility: CLINIC | Age: 24
End: 2019-10-02
Attending: INTERNAL MEDICINE
Payer: MEDICARE

## 2019-10-02 VITALS
TEMPERATURE: 97.9 F | SYSTOLIC BLOOD PRESSURE: 143 MMHG | WEIGHT: 226.8 LBS | BODY MASS INDEX: 31.75 KG/M2 | RESPIRATION RATE: 16 BRPM | DIASTOLIC BLOOD PRESSURE: 74 MMHG | OXYGEN SATURATION: 97 % | HEART RATE: 87 BPM | HEIGHT: 71 IN

## 2019-10-02 DIAGNOSIS — R40.4 TRANSIENT ALTERATION OF AWARENESS: ICD-10-CM

## 2019-10-02 DIAGNOSIS — R71.8 MICROCYTOSIS: Primary | ICD-10-CM

## 2019-10-02 DIAGNOSIS — R00.2 PALPITATIONS: ICD-10-CM

## 2019-10-02 LAB
BASOPHILS # BLD AUTO: 0 10E9/L (ref 0–0.2)
BASOPHILS NFR BLD AUTO: 0.4 %
DIFFERENTIAL METHOD BLD: ABNORMAL
EOSINOPHIL # BLD AUTO: 0 10E9/L (ref 0–0.7)
EOSINOPHIL NFR BLD AUTO: 0 %
ERYTHROCYTE [DISTWIDTH] IN BLOOD BY AUTOMATED COUNT: 12.7 % (ref 10–15)
HCT VFR BLD AUTO: 45.2 % (ref 40–53)
HGB BLD-MCNC: 15.1 G/DL (ref 13.3–17.7)
IMM GRANULOCYTES # BLD: 0 10E9/L (ref 0–0.4)
IMM GRANULOCYTES NFR BLD: 0.2 %
LYMPHOCYTES # BLD AUTO: 1.6 10E9/L (ref 0.8–5.3)
LYMPHOCYTES NFR BLD AUTO: 34.5 %
MCH RBC QN AUTO: 25.9 PG (ref 26.5–33)
MCHC RBC AUTO-ENTMCNC: 33.4 G/DL (ref 31.5–36.5)
MCV RBC AUTO: 78 FL (ref 78–100)
MONOCYTES # BLD AUTO: 0.4 10E9/L (ref 0–1.3)
MONOCYTES NFR BLD AUTO: 9.1 %
NEUTROPHILS # BLD AUTO: 2.5 10E9/L (ref 1.6–8.3)
NEUTROPHILS NFR BLD AUTO: 55.8 %
NRBC # BLD AUTO: 0 10*3/UL
NRBC BLD AUTO-RTO: 0 /100
PLATELET # BLD AUTO: 242 10E9/L (ref 150–450)
RBC # BLD AUTO: 5.82 10E12/L (ref 4.4–5.9)
RETICS # AUTO: 63.4 10E9/L (ref 25–95)
RETICS/RBC NFR AUTO: 1.1 % (ref 0.5–2)
WBC # BLD AUTO: 4.5 10E9/L (ref 4–11)

## 2019-10-02 PROCEDURE — 36415 COLL VENOUS BLD VENIPUNCTURE: CPT | Performed by: INTERNAL MEDICINE

## 2019-10-02 PROCEDURE — G0463 HOSPITAL OUTPT CLINIC VISIT: HCPCS

## 2019-10-02 PROCEDURE — 85045 AUTOMATED RETICULOCYTE COUNT: CPT | Performed by: INTERNAL MEDICINE

## 2019-10-02 PROCEDURE — 85025 COMPLETE CBC W/AUTO DIFF WBC: CPT | Performed by: INTERNAL MEDICINE

## 2019-10-02 PROCEDURE — 0298T ZIO PATCH HOLTER ADULT PEDIATRIC GREATER THAN 48 HRS: CPT | Performed by: INTERNAL MEDICINE

## 2019-10-02 PROCEDURE — 82525 ASSAY OF COPPER: CPT | Performed by: INTERNAL MEDICINE

## 2019-10-02 PROCEDURE — 83655 ASSAY OF LEAD: CPT | Performed by: INTERNAL MEDICINE

## 2019-10-02 PROCEDURE — 40000847 ZZHCL STATISTIC MORPHOLOGY W/INTERP HISTOLOGY TC 85060: Performed by: INTERNAL MEDICINE

## 2019-10-02 PROCEDURE — 85060 BLOOD SMEAR INTERPRETATION: CPT | Performed by: INTERNAL MEDICINE

## 2019-10-02 PROCEDURE — 99204 OFFICE O/P NEW MOD 45 MIN: CPT | Performed by: INTERNAL MEDICINE

## 2019-10-02 PROCEDURE — 0296T ZIO PATCH HOLTER ADULT PEDIATRIC GREATER THAN 48 HRS: CPT

## 2019-10-02 PROCEDURE — 36415 COLL VENOUS BLD VENIPUNCTURE: CPT

## 2019-10-02 PROCEDURE — 83021 HEMOGLOBIN CHROMOTOGRAPHY: CPT | Performed by: INTERNAL MEDICINE

## 2019-10-02 PROCEDURE — 84630 ASSAY OF ZINC: CPT | Performed by: INTERNAL MEDICINE

## 2019-10-02 ASSESSMENT — MIFFLIN-ST. JEOR: SCORE: 2032.95

## 2019-10-02 ASSESSMENT — PAIN SCALES - GENERAL: PAINLEVEL: NO PAIN (0)

## 2019-10-02 NOTE — PROGRESS NOTES
"Oncology Rooming Note    October 2, 2019 1:14 PM   Savage Roberts is a 24 year old male who presents for:    Chief Complaint   Patient presents with     Hematology     New Patient - Low mean corpuscular volume (MCV) - Microcytosis     Initial Vitals: BP (!) 143/74 (BP Location: Right arm, Patient Position: Sitting, Cuff Size: Adult Large)   Pulse 87   Temp 97.9  F (36.6  C) (Tympanic)   Resp 16   Ht 1.791 m (5' 10.5\")   Wt 102.9 kg (226 lb 12.8 oz)   SpO2 97%   BMI 32.08 kg/m   Estimated body mass index is 32.08 kg/m  as calculated from the following:    Height as of this encounter: 1.791 m (5' 10.5\").    Weight as of this encounter: 102.9 kg (226 lb 12.8 oz). Body surface area is 2.26 meters squared.  No Pain (0) Comment: Data Unavailable   No LMP for male patient.  Allergies reviewed: Yes  Medications reviewed: Yes    Medications: Medication refills not needed today.  Pharmacy name entered into [facility]:    [id number] - [place], [place] - [address] [facility] [place], [place] - [address] [place] [address]    Clinical concerns: New Patient - Low mean corpuscular volume (MCV) - Microcytosis.       Sue Douglass, [facility]              "

## 2019-10-02 NOTE — LETTER
"    10/2/2019         RE: Savage Roberts  [address] [place] [address]        Dear Colleague,    Thank you for referring your patient, Savage Roberts, to the [facility]. Please see a copy of my visit note below.    Oncology Rooming Note    October 2, 2019 1:14 PM   Savage Roberts is a 24 year old male who presents for:    Chief Complaint   Patient presents with     Hematology     New Patient - Low mean corpuscular volume (MCV) - Microcytosis     Initial Vitals: BP (!) 143/74 (BP Location: Right arm, Patient Position: Sitting, Cuff Size: Adult Large)   Pulse 87   Temp 97.9  F (36.6  C) (Tympanic)   Resp 16   Ht 1.791 m (5' 10.5\")   Wt 102.9 kg (226 lb 12.8 oz)   SpO2 97%   BMI 32.08 kg/m    Estimated body mass index is 32.08 kg/m  as calculated from the following:    Height as of this encounter: 1.791 m (5' 10.5\").    Weight as of this encounter: 102.9 kg (226 lb 12.8 oz). Body surface area is 2.26 meters squared.  No Pain (0) Comment: Data Unavailable   No LMP for male patient.  Allergies reviewed: Yes  Medications reviewed: Yes    Medications: Medication refills not needed today.  Pharmacy name entered into [facility]:    [id number], [place] - [address] [facility], [place] - [address] [place] [address]    Clinical concerns: New Patient - Low mean corpuscular volume (MCV) - Microcytosis.       Sue Douglass CMA                DATE OF VISIT: Oct 2, 2019    REASON FOR REFERRAL: Microcytosis    CHIEF COMPLAINT:   Chief Complaint   Patient presents with     Hematology     New Patient - Low mean corpuscular volume (MCV) - Microcytosis       HISTORY OF PRESENT ILLNESS:   Savage Swenson 24-year-old male patient presents today for evaluation for microcytosis.  Patient has been evaluated by primary care physician for complaint of dizziness and lightheadedness, and possible seizure disorder.  He had a work-up which included a CBC done " on 10/11/2019 which her total white count of 2.8 with a low hemoglobin at 15.  Normal platelet at 227 000.  If significant back minimally lower than normal at 76.  Patient was referred for further evaluation for microcytosis.  There is no family history of anemia or toxemia.  Serum ferritin was done which came back 98.  Rest of iron is is including serum iron of 120 and AST of 314 and iron saturation index of 38.  Patient is here today to discuss this of microcytosis.    REVIEW OF SYSTEMS:   Constitutional: Negative for fever, chills, and night sweats.  Skin: negative.  Eyes: negative.  Ears/Nose/Throat: negative.  Respiratory: No shortness of breath, dyspnea on exertion, cough, or hemoptysis.  Cardiovascular: negative.  Gastrointestinal: negative.  Genitourinary: negative.  Musculoskeletal: negative.  Neurologic: Dizzy spells as mentioned above  Psychiatric: negative.  Hematologic/Lymphatic/Immunologic: negative.  Endocrine: negative.    PAST MEDICAL HISTORY:   Past Medical History:   Diagnosis Date     Anxiety disorder      Autistic disorder      Bipolar affective disorder (H)      Duane syndrome      Gastro-oesophageal reflux disease      OCD (obsessive compulsive disorder)      PTSD (post-traumatic stress disorder)      Strabismus        PAST SURGICAL HISTORY:   Past Surgical History:   Procedure Laterality Date     NISSEN FUNDOPLICATION  1/2010     RECESSION RESECTION (REPAIR STRABISMUS) BILATERAL Bilateral 1/20/2015    Procedure: RECESSION RESECTION (REPAIR STRABISMUS) BILATERAL;  Surgeon: Shaun Storey MD;  Location: [facility]     SEPTOPLASTY  12/2011     STRABISMUS SURGERY  6/27/13    RLRc 5 and LLRc 4mm (Daniel)       ALLERGIES:   Allergies as of 10/02/2019 - Reviewed 10/02/2019   Allergen Reaction Noted     Dust mites  12/17/2014       MEDICATIONS:   No current outpatient medications on file.        FAMILY HISTORY:   Family History   Problem Relation Age of Onset     Strabismus Maternal Grandmother          "no sx, no glasses, no patching     Strabismus Other         strab sx, glasses/strab sx     Neurologic Disorder Mother         nystagmus, bipolar, narcolepsy     Multiple Sclerosis Maternal Grandfather      Multiple Sclerosis Maternal Uncle           SOCIAL HISTORY:   Social History     Socioeconomic History     Marital status: Single     Spouse name: None     Number of children: None     Years of education: None     Highest education level: None   Occupational History     None   Social Needs     Financial resource strain: None     Food insecurity:     Worry: None     Inability: None     Transportation needs:     Medical: None     Non-medical: None   Tobacco Use     Smoking status: Never Smoker     Smokeless tobacco: Never Used   Substance and Sexual Activity     Alcohol use: No     Drug use: No     Sexual activity: Yes     Partners: Female   Lifestyle     Physical activity:     Days per week: None     Minutes per session: None     Stress: None   Relationships     Social connections:     Talks on phone: None     Gets together: None     Attends Faith service: None     Active member of club or organization: None     Attends meetings of clubs or organizations: None     Relationship status: None     Intimate partner violence:     Fear of current or ex partner: None     Emotionally abused: None     Physically abused: None     Forced sexual activity: None   Other Topics Concern     Parent/sibling w/ CABG, MI or angioplasty before 65F 55M? Not Asked   Social History Narrative     None       PHYSICAL EXAMINATION:   BP (!) 143/74 (BP Location: Right arm, Patient Position: Sitting, Cuff Size: Adult Large)   Pulse 87   Temp 97.9  F (36.6  C) (Tympanic)   Resp 16   Ht 1.791 m (5' 10.5\")   Wt 102.9 kg (226 lb 12.8 oz)   SpO2 97%   BMI 32.08 kg/m     Wt Readings from Last 10 Encounters:   10/02/19 102.9 kg (226 lb 12.8 oz)   09/10/19 100.5 kg (221 lb 9.6 oz)   11/30/18 98.4 kg (217 lb)   09/18/18 90.7 kg (200 lb) "   03/30/18 93.7 kg (206 lb 9.6 oz)   01/20/15 108.1 kg (238 lb 5.1 oz) (99 %)*     * Growth percentiles are based on CDC (Boys, 2-20 Years) data.      GENERAL APPEARANCE: Healthy, alert and in no acute distress.  HEENT: Sclerae anicteric. PERRLA. Oropharynx without ulcers, lesions, or thrush.  NECK: Supple. No asymmetry or masses.  LYMPHATICS: No palpable cervical, supraclavicular, axillary, or inguinal lymphadenopathy.  RESP: Lungs clear to auscultation bilaterally without rales, rhonchi or wheezes.  CARDIOVASCULAR: Regular rate and rhythm. Normal S1, S2; no S3 or S4. No murmur, gallop, or rub.  ABDOMEN: Soft, nontender. Bowel sounds normal. No palpable organomegaly or masses.  MUSCULOSKELETAL: Extremities without gross deformities noted. No edema of bilateral lower extremities.  SKIN: No suspicious lesions or rashes.  NEURO: Alert and oriented x 3. Cranial nerves II-XII grossly intact.  PSYCHIATRIC: Mentation and affect appear normal.    LABORATORY RESULTS:  Orders Only on 09/11/2019   Component Date Value Ref Range Status     Ferritin 09/11/2019 98  26 - 388 ng/mL Final     Iron 09/11/2019 120  35 - 180 ug/dL Final     Iron Binding Cap 09/11/2019 314  240 - 430 ug/dL Final     Iron Saturation Index 09/11/2019 38  15 - 46 % Final     % Retic 09/11/2019 0.9  0.5 - 2.0 % Final     Absolute Retic 09/11/2019 48.4  25 - 95 10e9/L Final     WBC 09/11/2019 4.8  4.0 - 11.0 10e9/L Final     RBC Count 09/11/2019 5.65  4.4 - 5.9 10e12/L Final     Hemoglobin 09/11/2019 15.0  13.3 - 17.7 g/dL Final     Hematocrit 09/11/2019 42.9  40.0 - 53.0 % Final     MCV 09/11/2019 76* 78 - 100 fl Final     MCH 09/11/2019 26.5  26.5 - 33.0 pg Final     MCHC 09/11/2019 35.0  31.5 - 36.5 g/dL Final     RDW 09/11/2019 12.9  10.0 - 15.0 % Final     Platelet Count 09/11/2019 227  150 - 450 10e9/L Final     % Neutrophils 09/11/2019 51.9  % Final     % Lymphocytes 09/11/2019 38.2  % Final     % Monocytes 09/11/2019 9.3  % Final     % Eosinophils  09/11/2019 0.0  % Final     % Basophils 09/11/2019 0.6  % Final     Absolute Neutrophil 09/11/2019 2.5  1.6 - 8.3 10e9/L Final     Absolute Lymphocytes 09/11/2019 1.9  0.8 - 5.3 10e9/L Final     Absolute Monocytes 09/11/2019 0.5  0.0 - 1.3 10e9/L Final     Absolute Eosinophils 09/11/2019 0.0  0.0 - 0.7 10e9/L Final     Absolute Basophils 09/11/2019 0.0  0.0 - 0.2 10e9/L Final     Diff Method 09/11/2019 Automated Method   Final     Copath Report 09/11/2019    Final                    Value:Patient Name: DG BEAR  MR#: [id number]  Specimen #: [id number]  Collected: 9/11/2019  Received: 9/12/2019  Reported: 9/12/2019 13:33  Ordering Phy(s): KERRI GONZALES    For improved result formatting, select 'View Enhanced Report Format' under   Linked Documents section.    TEST(S):  Peripheral Smear Morphology    FINAL DIAGNOSIS:  Peripheral Smear Morphology:  - Microcytosis - see comment    COMMENT:  Iron studies and hemoglobin concentration are within reference range and   the hemoglobin Microcytosis may be  seen in association with hemoglobinopathy.  Correlation with family   history and, if clinically deemed  necessary, hemoglobin electrophoresis is recommended.    Electronically signed out by:    Nile Rawls M.D., PhD    CLINICAL HISTORY:  24 year old male.    PERIPHERAL BLOOD DATA:  PERIPHERAL BLOOD DATA (Date: 09/11/2019)  Patient Value (Reference Range >18 year old male)  4.84    WBC         (4.0-11.0 x 10*9/L)  5.65    RBC         (4.4-5.9 x 10*12/L)                            15.0    HGB         (13.3-17.7 g/dL)  42.9    HCT         (40.0-53.0 %)  75.9    MCV         (78-100fL)  26.5    MCH         (26.5-33.0 pg)  35.0    MCHC       (31.5-36.5 g/dL)  12.9    RDW         (10.0-15.0 %)  227.0    PLT         (150-450 x 10*9/L)    PERIPHERAL BLOOD DIFFERENTIAL - Manual 200 cells.  Relative counts  54.0%  Neutrophils  42.0%  Lymphocytes  4.0%  Monocytes  0.0%  Eosinophils  0.0%  Basophils    Absolute  counts  2.6   Neutrophils  (Ref normal 1.6 - 8.3 x 10*9/L)  2.0   Lymphocytes  (Ref normal 0.8 - 5.3 x 10*9/L)  0.2   Monocytes  (Ref normal 0 -1.3 x 10*9/L)  0.0   Eosinophils  (Ref normal 0 - 0.7 x 10*9/L)  0.0   Basophils  (Ref normal 0 - 0.2 x 10*9/L)    PERIPHERAL BLOOD MORPHOLOGY  The red blood cells are normal in number, microcytic and   borderlinenormochromic.  There is no  anisopoikilocytosis.  There is no increased polychromasia.  No rouleaux   formation is noted.  No intracellular  organisms are identified.    The white blood cells are normal in number,                           morphology and absolute   differential count.  No intracellular  organisms are identified.    The platelets are normal in number with normal morphology and occasional   larger well granulated forms.    The technical component of this testing was completed at the [facility] with the professional component performed   at the [facility] [address] [place] [phone] ([phone])    CPT Codes:  A: 24793-TRFU    COLLECTION SITE:  Client:  [facility]  Location:  [facility])           IMAGING RESULTS:  Recent Results (from the past 744 hour(s))   MR Brain w/o Contrast    Narrative    MRI BRAIN WITHOUT CONTRAST  9/12/2019 5:57 PM    HISTORY: Altered mental status, unexplained. Transient alteration of  awareness. History of multiple concussions.    TECHNIQUE: Multiplanar, multisequence MRI of the brain without  gadolinium IV contrast material.    COMPARISON: None.    FINDINGS: The brain parenchyma, ventricles and subarachnoid spaces  appear normal. There is no evidence of hemorrhage, mass, acute  infarct, or anomaly.     There is scattered mucosal thickening in the paranasal sinuses. The  arteries at the base of the brain and the dural venous sinuses appear  patent.      Impression     IMPRESSION: Normal MRI of the brain.      SANDRA ORTIZ MD       ASSESSMENT AND PLAN:    (R71.8) Microcytosis  (primary encounter diagnosis)  This 24-year-old male patient with mild microcytosis.  Patient is not anemic or symptomatic.  I discussed with the patient today causes microcytosis details.  Today we will arrange for Blood Morphology Pathologist Review, CBC with platelets differential, Reticulocyte Count, HGB Eval Reflex to ELP or RBC Solubility, Zinc, Lead Screening.  I will update the patient with results when they are available.    The patient is ready to learn, no apparent learning barriers were identified, Diagnosis and treatment plans were explained to the patient. The patient expressed understanding of the content. The patient questions were answered to his satisfaction.    Devon Gee MD    Chart documentation with Dragon Voice recognition Software. Although reviewed after completion, some words and grammatical errors may remain.      Again, thank you for allowing me to participate in the care of your patient.        Sincerely,        Devon Gee MD

## 2019-10-04 LAB
COPATH REPORT: NORMAL
HGB A1 MFR BLD: 96.6 % (ref 95–97.9)
HGB A2 MFR BLD: 2.9 % (ref 2–3.5)
HGB C MFR BLD: 0 % (ref 0–0)
HGB E MFR BLD: 0 % (ref 0–0)
HGB F MFR BLD: 0.5 % (ref 0–2.1)
HGB FRACT BLD ELPH-IMP: NORMAL
HGB OTHER MFR BLD: 0 % (ref 0–0)
HGB S BLD QL SOLY: NORMAL
HGB S MFR BLD: 0 % (ref 0–0)
LEAD BLDV-MCNC: <2 UG/DL (ref 0–4.9)
PATH INTERP BLD-IMP: NORMAL

## 2019-10-05 LAB
COPPER SERPL-MCNC: 102.5 UG/DL (ref 70–140)
ZINC SERPL-MCNC: 79.6 UG/DL (ref 60–120)

## 2019-10-08 ENCOUNTER — PATIENT OUTREACH (OUTPATIENT)
Dept: ONCOLOGY | Facility: CLINIC | Age: 24
End: 2019-10-08

## 2019-10-10 PROBLEM — R71.8 MICROCYTOSIS: Status: ACTIVE | Noted: 2019-10-10

## 2019-10-10 NOTE — PROGRESS NOTES
DATE OF VISIT: Oct 2, 2019    REASON FOR REFERRAL: Microcytosis    CHIEF COMPLAINT:   Chief Complaint   Patient presents with     Hematology     New Patient - Low mean corpuscular volume (MCV) - Microcytosis       HISTORY OF PRESENT ILLNESS:   Savage Swenson 24-year-old male patient presents today for evaluation for microcytosis.  Patient has been evaluated by primary care physician for complaint of dizziness and lightheadedness, and possible seizure disorder.  He had a work-up which included a CBC done on 10/11/2019 which her total white count of 2.8 with a low hemoglobin at 15.  Normal platelet at 227 000.  If significant back minimally lower than normal at 76.  Patient was referred for further evaluation for microcytosis.  There is no family history of anemia or toxemia.  Serum ferritin was done which came back 98.  Rest of iron is is including serum iron of 120 and AST of 314 and iron saturation index of 38.  Patient is here today to discuss this of microcytosis.    REVIEW OF SYSTEMS:   Constitutional: Negative for fever, chills, and night sweats.  Skin: negative.  Eyes: negative.  Ears/Nose/Throat: negative.  Respiratory: No shortness of breath, dyspnea on exertion, cough, or hemoptysis.  Cardiovascular: negative.  Gastrointestinal: negative.  Genitourinary: negative.  Musculoskeletal: negative.  Neurologic: Dizzy spells as mentioned above  Psychiatric: negative.  Hematologic/Lymphatic/Immunologic: negative.  Endocrine: negative.    PAST MEDICAL HISTORY:   Past Medical History:   Diagnosis Date     Anxiety disorder      Autistic disorder      Bipolar affective disorder (H)      Duane syndrome      Gastro-oesophageal reflux disease      OCD (obsessive compulsive disorder)      PTSD (post-traumatic stress disorder)      Strabismus        PAST SURGICAL HISTORY:   Past Surgical History:   Procedure Laterality Date     NISSEN FUNDOPLICATION  1/2010     RECESSION RESECTION (REPAIR STRABISMUS) BILATERAL Bilateral  1/20/2015    Procedure: RECESSION RESECTION (REPAIR STRABISMUS) BILATERAL;  Surgeon: Shaun Storey MD;  Location: [facility]     SEPTOPLASTY  12/2011     STRABISMUS SURGERY  6/27/13    RLRc 5 and LLRc 4mm (Daniel)       ALLERGIES:   Allergies as of 10/02/2019 - Reviewed 10/02/2019   Allergen Reaction Noted     Dust mites  12/17/2014       MEDICATIONS:   No current outpatient medications on file.        FAMILY HISTORY:   Family History   Problem Relation Age of Onset     Strabismus Maternal Grandmother         no sx, no glasses, no patching     Strabismus Other         strab sx, glasses/strab sx     Neurologic Disorder Mother         nystagmus, bipolar, narcolepsy     Multiple Sclerosis Maternal Grandfather      Multiple Sclerosis Maternal Uncle           SOCIAL HISTORY:   Social History     Socioeconomic History     Marital status: Single     Spouse name: None     Number of children: None     Years of education: None     Highest education level: None   Occupational History     None   Social Needs     Financial resource strain: None     Food insecurity:     Worry: None     Inability: None     Transportation needs:     Medical: None     Non-medical: None   Tobacco Use     Smoking status: Never Smoker     Smokeless tobacco: Never Used   Substance and Sexual Activity     Alcohol use: No     Drug use: No     Sexual activity: Yes     Partners: Female   Lifestyle     Physical activity:     Days per week: None     Minutes per session: None     Stress: None   Relationships     Social connections:     Talks on phone: None     Gets together: None     Attends Islam service: None     Active member of club or organization: None     Attends meetings of clubs or organizations: None     Relationship status: None     Intimate partner violence:     Fear of current or ex partner: None     Emotionally abused: None     Physically abused: None     Forced sexual activity: None   Other Topics Concern     Parent/sibling w/ CABG, MI or  "angioplasty before 65F 55M? Not Asked   Social History Narrative     None       PHYSICAL EXAMINATION:   BP (!) 143/74 (BP Location: Right arm, Patient Position: Sitting, Cuff Size: Adult Large)   Pulse 87   Temp 97.9  F (36.6  C) (Tympanic)   Resp 16   Ht 1.791 m (5' 10.5\")   Wt 102.9 kg (226 lb 12.8 oz)   SpO2 97%   BMI 32.08 kg/m    Wt Readings from Last 10 Encounters:   10/02/19 102.9 kg (226 lb 12.8 oz)   09/10/19 100.5 kg (221 lb 9.6 oz)   11/30/18 98.4 kg (217 lb)   09/18/18 90.7 kg (200 lb)   03/30/18 93.7 kg (206 lb 9.6 oz)   01/20/15 108.1 kg (238 lb 5.1 oz) (99 %)*     * Growth percentiles are based on [facility] (Boys, 2-20 Years) data.      GENERAL APPEARANCE: Healthy, alert and in no acute distress.  HEENT: Sclerae anicteric. PERRLA. Oropharynx without ulcers, lesions, or thrush.  NECK: Supple. No asymmetry or masses.  LYMPHATICS: No palpable cervical, supraclavicular, axillary, or inguinal lymphadenopathy.  RESP: Lungs clear to auscultation bilaterally without rales, rhonchi or wheezes.  CARDIOVASCULAR: Regular rate and rhythm. Normal S1, S2; no S3 or S4. No murmur, gallop, or rub.  ABDOMEN: Soft, nontender. Bowel sounds normal. No palpable organomegaly or masses.  MUSCULOSKELETAL: Extremities without gross deformities noted. No edema of bilateral lower extremities.  SKIN: No suspicious lesions or rashes.  NEURO: Alert and oriented x 3. Cranial nerves II-XII grossly intact.  PSYCHIATRIC: Mentation and affect appear normal.    LABORATORY RESULTS:  Orders Only on 09/11/2019   Component Date Value Ref Range Status     Ferritin 09/11/2019 98  26 - 388 ng/mL Final     Iron 09/11/2019 120  35 - 180 ug/dL Final     Iron Binding Cap 09/11/2019 314  240 - 430 ug/dL Final     Iron Saturation Index 09/11/2019 38  15 - 46 % Final     % Retic 09/11/2019 0.9  0.5 - 2.0 % Final     Absolute Retic 09/11/2019 48.4  25 - 95 10e9/L Final     WBC 09/11/2019 4.8  4.0 - 11.0 10e9/L Final     RBC Count 09/11/2019 5.65  4.4 - " 5.9 10e12/L Final     Hemoglobin 09/11/2019 15.0  13.3 - 17.7 g/dL Final     Hematocrit 09/11/2019 42.9  40.0 - 53.0 % Final     MCV 09/11/2019 76* 78 - 100 fl Final     MCH 09/11/2019 26.5  26.5 - 33.0 pg Final     MCHC 09/11/2019 35.0  31.5 - 36.5 g/dL Final     RDW 09/11/2019 12.9  10.0 - 15.0 % Final     Platelet Count 09/11/2019 227  150 - 450 10e9/L Final     % Neutrophils 09/11/2019 51.9  % Final     % Lymphocytes 09/11/2019 38.2  % Final     % Monocytes 09/11/2019 9.3  % Final     % Eosinophils 09/11/2019 0.0  % Final     % Basophils 09/11/2019 0.6  % Final     Absolute Neutrophil 09/11/2019 2.5  1.6 - 8.3 10e9/L Final     Absolute Lymphocytes 09/11/2019 1.9  0.8 - 5.3 10e9/L Final     Absolute Monocytes 09/11/2019 0.5  0.0 - 1.3 10e9/L Final     Absolute Eosinophils 09/11/2019 0.0  0.0 - 0.7 10e9/L Final     Absolute Basophils 09/11/2019 0.0  0.0 - 0.2 10e9/L Final     Diff Method 09/11/2019 Automated Method   Final     Copath Report 09/11/2019    Final                    Value:Patient Name: DG BEAR  MR#: [id number]  Specimen #: [id number]  Collected: 9/11/2019  Received: 9/12/2019  Reported: 9/12/2019 13:33  Ordering Phy(s): KERRI GONZALES    For improved result formatting, select 'View Enhanced Report Format' under   Linked Documents section.    TEST(S):  Peripheral Smear Morphology    FINAL DIAGNOSIS:  Peripheral Smear Morphology:  - Microcytosis - see comment    COMMENT:  Iron studies and hemoglobin concentration are within reference range and   the hemoglobin Microcytosis may be  seen in association with hemoglobinopathy.  Correlation with family   history and, if clinically deemed  necessary, hemoglobin electrophoresis is recommended.    Electronically signed out by:    Nile Rawls M.D., PhD    CLINICAL HISTORY:  24 year old male.    PERIPHERAL BLOOD DATA:  PERIPHERAL BLOOD DATA (Date: 09/11/2019)  Patient Value (Reference Range >18 year old male)  4.84    WBC          (4.0-11.0 x 10*9/L)  5.65    RBC         (4.4-5.9 x 10*12/L)                            15.0    HGB         (13.3-17.7 g/dL)  42.9    HCT         (40.0-53.0 %)  75.9    MCV         (78-100fL)  26.5    MCH         (26.5-33.0 pg)  35.0    MCHC       (31.5-36.5 g/dL)  12.9    RDW         (10.0-15.0 %)  227.0    PLT         (150-450 x 10*9/L)    PERIPHERAL BLOOD DIFFERENTIAL - Manual 200 cells.  Relative counts  54.0%  Neutrophils  42.0%  Lymphocytes  4.0%  Monocytes  0.0%  Eosinophils  0.0%  Basophils    Absolute counts  2.6   Neutrophils  (Ref normal 1.6 - 8.3 x 10*9/L)  2.0   Lymphocytes  (Ref normal 0.8 - 5.3 x 10*9/L)  0.2   Monocytes  (Ref normal 0 -1.3 x 10*9/L)  0.0   Eosinophils  (Ref normal 0 - 0.7 x 10*9/L)  0.0   Basophils  (Ref normal 0 - 0.2 x 10*9/L)    PERIPHERAL BLOOD MORPHOLOGY  The red blood cells are normal in number, microcytic and   borderlinenormochromic.  There is no  anisopoikilocytosis.  There is no increased polychromasia.  No rouleaux   formation is noted.  No intracellular  organisms are identified.    The white blood cells are normal in number,                           morphology and absolute   differential count.  No intracellular  organisms are identified.    The platelets are normal in number with normal morphology and occasional   larger well granulated forms.    The technical component of this testing was completed at the [facility], with the professional component performed   at the [facility], [address] [place] [id number] ([phone])    CPT Codes:  A: 62582-UWZE    COLLECTION SITE:  Client:  [facility]  Location:  [facility] ()           IMAGING RESULTS:  Recent Results (from the past 744 hour(s))   MR Brain w/o Contrast    Narrative    MRI BRAIN WITHOUT CONTRAST  9/12/2019 5:57 PM    HISTORY: Altered mental status, unexplained.  Transient alteration of  awareness. History of multiple concussions.    TECHNIQUE: Multiplanar, multisequence MRI of the brain without  gadolinium IV contrast material.    COMPARISON: None.    FINDINGS: The brain parenchyma, ventricles and subarachnoid spaces  appear normal. There is no evidence of hemorrhage, mass, acute  infarct, or anomaly.     There is scattered mucosal thickening in the paranasal sinuses. The  arteries at the base of the brain and the dural venous sinuses appear  patent.      Impression    IMPRESSION: Normal MRI of the brain.      SANDRA ORTIZ MD       ASSESSMENT AND PLAN:    (R71.8) Microcytosis  (primary encounter diagnosis)  This 24-year-old male patient with mild microcytosis.  Patient is not anemic or symptomatic.  I discussed with the patient today causes microcytosis details.  Today we will arrange for Blood Morphology Pathologist Review, CBC with platelets differential, Reticulocyte Count, HGB Eval Reflex to ELP or RBC Solubility, Zinc, Lead Screening.  I will update the patient with results when they are available.    The patient is ready to learn, no apparent learning barriers were identified, Diagnosis and treatment plans were explained to the patient. The patient expressed understanding of the content. The patient questions were answered to his satisfaction.    Devon Gee MD    Chart documentation with Dragon Voice recognition Software. Although reviewed after completion, some words and grammatical errors may remain.

## 2019-12-08 ENCOUNTER — HOSPITAL ENCOUNTER (EMERGENCY)
Facility: CLINIC | Age: 24
Discharge: HOME | End: 2019-12-08
Attending: EMERGENCY MEDICINE | Admitting: EMERGENCY MEDICINE
Payer: MEDICARE

## 2019-12-08 VITALS
BODY MASS INDEX: 31.12 KG/M2 | SYSTOLIC BLOOD PRESSURE: 118 MMHG | WEIGHT: 220 LBS | OXYGEN SATURATION: 97 % | DIASTOLIC BLOOD PRESSURE: 49 MMHG | HEART RATE: 65 BPM | TEMPERATURE: 98.4 F

## 2019-12-08 DIAGNOSIS — R45.851 SUICIDAL IDEATION: ICD-10-CM

## 2019-12-08 DIAGNOSIS — F33.9 RECURRENT MAJOR DEPRESSIVE DISORDER, REMISSION STATUS UNSPECIFIED (H): ICD-10-CM

## 2019-12-08 DIAGNOSIS — T50.902A MEDICATION OVERDOSE, INTENTIONAL SELF-HARM, INITIAL ENCOUNTER (H): ICD-10-CM

## 2019-12-08 LAB
ALBUMIN SERPL-MCNC: 4 G/DL (ref 3.4–5)
ALP SERPL-CCNC: 70 U/L (ref 40–150)
ALT SERPL W P-5'-P-CCNC: 61 U/L (ref 0–70)
ANION GAP SERPL CALCULATED.3IONS-SCNC: 7 MMOL/L (ref 3–14)
APAP SERPL-MCNC: <2 MG/L (ref 10–20)
AST SERPL W P-5'-P-CCNC: 23 U/L (ref 0–45)
BASOPHILS # BLD AUTO: 0 10E9/L (ref 0–0.2)
BASOPHILS NFR BLD AUTO: 0.4 %
BILIRUB SERPL-MCNC: 0.4 MG/DL (ref 0.2–1.3)
BUN SERPL-MCNC: 15 MG/DL (ref 7–30)
CALCIUM SERPL-MCNC: 8.9 MG/DL (ref 8.5–10.1)
CHLORIDE SERPL-SCNC: 111 MMOL/L (ref 94–109)
CO2 SERPL-SCNC: 24 MMOL/L (ref 20–32)
CREAT SERPL-MCNC: 0.94 MG/DL (ref 0.66–1.25)
DIFFERENTIAL METHOD BLD: ABNORMAL
EOSINOPHIL # BLD AUTO: 0 10E9/L (ref 0–0.7)
EOSINOPHIL NFR BLD AUTO: 0.1 %
ERYTHROCYTE [DISTWIDTH] IN BLOOD BY AUTOMATED COUNT: 12.7 % (ref 10–15)
ETHANOL SERPL-MCNC: <0.01 G/DL
GFR SERPL CREATININE-BSD FRML MDRD: >90 ML/MIN/{1.73_M2}
GLUCOSE SERPL-MCNC: 96 MG/DL (ref 70–99)
HCT VFR BLD AUTO: 44.7 % (ref 40–53)
HGB BLD-MCNC: 14.7 G/DL (ref 13.3–17.7)
IMM GRANULOCYTES # BLD: 0 10E9/L (ref 0–0.4)
IMM GRANULOCYTES NFR BLD: 0.1 %
LYMPHOCYTES # BLD AUTO: 2.2 10E9/L (ref 0.8–5.3)
LYMPHOCYTES NFR BLD AUTO: 32.7 %
MCH RBC QN AUTO: 26 PG (ref 26.5–33)
MCHC RBC AUTO-ENTMCNC: 32.9 G/DL (ref 31.5–36.5)
MCV RBC AUTO: 79 FL (ref 78–100)
MONOCYTES # BLD AUTO: 0.5 10E9/L (ref 0–1.3)
MONOCYTES NFR BLD AUTO: 7.3 %
NEUTROPHILS # BLD AUTO: 4 10E9/L (ref 1.6–8.3)
NEUTROPHILS NFR BLD AUTO: 59.4 %
NRBC # BLD AUTO: 0 10*3/UL
NRBC BLD AUTO-RTO: 0 /100
PLATELET # BLD AUTO: 242 10E9/L (ref 150–450)
POTASSIUM SERPL-SCNC: 4.1 MMOL/L (ref 3.4–5.3)
PROT SERPL-MCNC: 7.3 G/DL (ref 6.8–8.8)
RBC # BLD AUTO: 5.66 10E12/L (ref 4.4–5.9)
SALICYLATES SERPL-MCNC: <2 MG/DL
SODIUM SERPL-SCNC: 142 MMOL/L (ref 133–144)
WBC # BLD AUTO: 6.7 10E9/L (ref 4–11)

## 2019-12-08 PROCEDURE — 93005 ELECTROCARDIOGRAM TRACING: CPT | Performed by: EMERGENCY MEDICINE

## 2019-12-08 PROCEDURE — 90791 PSYCH DIAGNOSTIC EVALUATION: CPT

## 2019-12-08 PROCEDURE — 25800030 ZZH RX IP 258 OP 636: Performed by: EMERGENCY MEDICINE

## 2019-12-08 PROCEDURE — 85025 COMPLETE CBC W/AUTO DIFF WBC: CPT | Performed by: EMERGENCY MEDICINE

## 2019-12-08 PROCEDURE — 99284 EMERGENCY DEPT VISIT MOD MDM: CPT | Mod: Z6 | Performed by: EMERGENCY MEDICINE

## 2019-12-08 PROCEDURE — 80329 ANALGESICS NON-OPIOID 1 OR 2: CPT | Mod: 59 | Performed by: EMERGENCY MEDICINE

## 2019-12-08 PROCEDURE — 80329 ANALGESICS NON-OPIOID 1 OR 2: CPT | Performed by: EMERGENCY MEDICINE

## 2019-12-08 PROCEDURE — 96361 HYDRATE IV INFUSION ADD-ON: CPT | Performed by: EMERGENCY MEDICINE

## 2019-12-08 PROCEDURE — 80053 COMPREHEN METABOLIC PANEL: CPT | Performed by: EMERGENCY MEDICINE

## 2019-12-08 PROCEDURE — 96360 HYDRATION IV INFUSION INIT: CPT | Performed by: EMERGENCY MEDICINE

## 2019-12-08 PROCEDURE — 80320 DRUG SCREEN QUANTALCOHOLS: CPT | Performed by: EMERGENCY MEDICINE

## 2019-12-08 PROCEDURE — 99285 EMERGENCY DEPT VISIT HI MDM: CPT | Mod: 25 | Performed by: EMERGENCY MEDICINE

## 2019-12-08 RX ADMIN — SODIUM CHLORIDE 1000 ML: 9 INJECTION, SOLUTION INTRAVENOUS at 08:03

## 2019-12-08 NOTE — ED NOTES
Pt  took 3000 mg ibuprofen in attempt to harm himself. Is frustrated with work mainly and just lost his job. Pt  is the same thing every 4 mo or so where he gets a job, someone doesn't like him and he gets fired or he doesn't like the job or what it pays and he quits. Pt dx with autism and depression. Pt has not been on depression meds since he was 18. Numerous attempts to harm himself with last being when he was 21.  cannot afford medical care. Lives with girlfriend.  has dysfunctional  Family relationships. Reports some abdominal pain since ingestion

## 2019-12-08 NOTE — ED AVS SNAPSHOT
[facility] Emergency Department  [address] [facility] [address]  Phone:  [phone]  Fax:  [phone]                                    Savage Roberts   MRN: [id number]    Department:  [facility] Emergency Department   Date of Visit:  12/8/2019           After Visit Summary Signature Page    I have received my discharge instructions, and my questions have been answered. I have discussed any challenges I see with this plan with the nurse or doctor.    ..........................................................................................................................................  Patient/Patient Representative Signature      ..........................................................................................................................................  Patient Representative Print Name and Relationship to Patient    ..................................................               ................................................  Date                                   Time    ..........................................................................................................................................  Reviewed by Signature/Title    ...................................................              ..............................................  Date                                               Time          22EPIC Rev 08/18

## 2019-12-11 NOTE — ED PROVIDER NOTES
"  History     Chief Complaint   Patient presents with     Drug Overdose     3000 mg ibuprofen 1 hr ago     HPI  Savage Roberts is a 24 year old male who presents with his fiancée, he took 3000 mg ibuprofen in a suicide gesture earlier today, and a regretted his decision immediately, told his girlfriend and called poison control.  Multiple \"suicide attempts\" in the past.  History of autism, he is frustrated with his inability to maintain ongoing employment and progress toward his goals.  Currently living with his fiancée and her parents.  No alcohol or illicit drug use.  Denies trauma or recent infection.  Denies coingestions.  Currently seeing a therapist, no current antidepressants.  Appetite is intact, sleeps poorly on a chronic basis.  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     Priority: Medium        Past Medical History:    Past Medical History:   Diagnosis Date     Anxiety disorder      Autistic disorder      Bipolar affective disorder (H)      Duane syndrome      Gastro-oesophageal reflux disease      OCD (obsessive compulsive disorder)      PTSD (post-traumatic stress disorder)      Strabismus        Past Surgical History:    Past Surgical History:   Procedure Laterality Date     NISSEN FUNDOPLICATION  1/2010     RECESSION RESECTION (REPAIR STRABISMUS) BILATERAL Bilateral 1/20/2015    Procedure: RECESSION RESECTION (REPAIR STRABISMUS) BILATERAL;  Surgeon: Shaun Storey MD;  Location: [facility]     SEPTOPLASTY  12/2011     STRABISMUS SURGERY  6/27/13    RLRc 5 and LLRc 4mm (Daniel)       Family History:    Family History   Problem Relation Age of Onset     Strabismus Maternal Grandmother         no sx, no glasses, no patching     Strabismus Other         strab sx, glasses/strab sx     Neurologic Disorder " Mother         nystagmus, bipolar, narcolepsy     Multiple Sclerosis Maternal Grandfather      Multiple Sclerosis Maternal Uncle        Social History:  Marital Status:  Single [1]  Social History     Tobacco Use     Smoking status: Never Smoker     Smokeless tobacco: Never Used   Substance Use Topics     Alcohol use: No     Drug use: No        Medications:    No current outpatient medications on file.        Review of Systems  All other systems reviewed and are negative.    Physical Exam   BP: 139/87  Pulse: 56  Heart Rate: 54  Temp: 98.4  F (36.9  C)  Resp: 16  Weight: 99.8 kg (220 lb)  SpO2: 98 %      Physical Exam  Nontoxic appearing no respiratory distress alert and oriented ×3  Head atraumatic normocephalic   Neck supple full active painless range of motion  Lungs clear to auscultation  Heart regular no murmur  Abdomen soft nontender bowel sounds positive no masses or HSM  Strength and sensation grossly intact throughout the extremities, gait and station normal  Speech is fluent, good eye contact, patient is not agitated, he has some concrete thinking, he is not delusional cooperates with exam answers questions appropriately, affect flat mood depressed  Lower extremities without swelling, redness or tenderness  Pedal pulses symmetrical and strong    ED Course        Procedures               Critical Care time:  none     Results for orders placed or performed during the hospital encounter of 12/08/19   CBC with platelets, differential     Status: Abnormal   Result Value Ref Range    WBC 6.7 4.0 - 11.0 10e9/L    RBC Count 5.66 4.4 - 5.9 10e12/L    Hemoglobin 14.7 13.3 - 17.7 g/dL    Hematocrit 44.7 40.0 - 53.0 %    MCV 79 78 - 100 fl    MCH 26.0 (L) 26.5 - 33.0 pg    MCHC 32.9 31.5 - 36.5 g/dL    RDW 12.7 10.0 - 15.0 %    Platelet Count 242 150 - 450 10e9/L    Diff Method Automated Method     % Neutrophils 59.4 %    % Lymphocytes 32.7 %    % Monocytes 7.3 %    % Eosinophils 0.1 %    % Basophils 0.4 %    %  Immature Granulocytes 0.1 %    Nucleated RBCs 0 0 /100    Absolute Neutrophil 4.0 1.6 - 8.3 10e9/L    Absolute Lymphocytes 2.2 0.8 - 5.3 10e9/L    Absolute Monocytes 0.5 0.0 - 1.3 10e9/L    Absolute Eosinophils 0.0 0.0 - 0.7 10e9/L    Absolute Basophils 0.0 0.0 - 0.2 10e9/L    Abs Immature Granulocytes 0.0 0 - 0.4 10e9/L    Absolute Nucleated RBC 0.0    Comprehensive metabolic panel     Status: Abnormal   Result Value Ref Range    Sodium 142 133 - 144 mmol/L    Potassium 4.1 3.4 - 5.3 mmol/L    Chloride 111 (H) 94 - 109 mmol/L    Carbon Dioxide 24 20 - 32 mmol/L    Anion Gap 7 3 - 14 mmol/L    Glucose 96 70 - 99 mg/dL    Urea Nitrogen 15 7 - 30 mg/dL    Creatinine 0.94 0.66 - 1.25 mg/dL    GFR Estimate >90 >60 mL/min/[1.73_m2]    GFR Estimate If Black >90 >60 mL/min/[1.73_m2]    Calcium 8.9 8.5 - 10.1 mg/dL    Bilirubin Total 0.4 0.2 - 1.3 mg/dL    Albumin 4.0 3.4 - 5.0 g/dL    Protein Total 7.3 6.8 - 8.8 g/dL    Alkaline Phosphatase 70 40 - 150 U/L    ALT 61 0 - 70 U/L    AST 23 0 - 45 U/L   Acetaminophen level     Status: None   Result Value Ref Range    Acetaminophen Level <2 mg/L   Salicylate level     Status: None   Result Value Ref Range    Salicylate Level <2 mg/dL   Alcohol level blood     Status: None   Result Value Ref Range    Ethanol g/dL <0.01 <0.01 g/dL                 No results found for this or any previous visit (from the past 24 hour(s)).    Medications   0.9% sodium chloride BOLUS (0 mLs Intravenous Stopped 12/8/19 1036)       Assessments & Plan (with Medical Decision Making)  24-year-old male with autism presents after suicide gesture ibuprofen overdose.  Reviewed with poison control, patient observed developed no symptoms.  Reviewed with DEC staff who do not believe patient requires admission at this time.  Arrange for close follow-up with therapist.  Contract for safety.  Return criteria reviewed     I have reviewed the nursing notes.    I have reviewed the findings, diagnosis, plan and need  for follow up with the patient.          There are no discharge medications for this patient.      Final diagnoses:   Medication overdose, intentional self-harm, initial encounter (H)   Suicidal ideation   Recurrent major depressive disorder, remission status unspecified (H)       12/8/2019   [facility] EMERGENCY DEPARTMENT     Mukul Woodard MD  12/11/19 0870

## 2021-02-22 ENCOUNTER — VIRTUAL VISIT (OUTPATIENT)
Dept: FAMILY MEDICINE | Facility: CLINIC | Age: 26
End: 2021-02-22
Payer: MEDICARE

## 2021-02-22 DIAGNOSIS — R19.7 DIARRHEA, UNSPECIFIED TYPE: Primary | ICD-10-CM

## 2021-02-22 PROCEDURE — 99441 PR PHYSICIAN TELEPHONE EVALUATION 5-10 MIN: CPT | Mod: 95 | Performed by: NURSE PRACTITIONER

## 2021-02-22 NOTE — PATIENT INSTRUCTIONS
Diarrhea, unspecified type  History of 3 days of watery loose stools with abdominal cramping, decreased appetite and weight loss last week.  Positive known contact to norovirus.  Symptoms have resolved and has been symptom-free for 3 days.  Denies any fevers or other symptoms.  Symptoms consistent with norovirus.  Okay to go back to work without any further testing.  Encourage patient to continue to keep well-hydrated.

## 2021-02-22 NOTE — PROGRESS NOTES
PAOLA is a 25 year old who is being evaluated via a billable telephone visit.      What phone number would you like to be contacted at? [phone]    How would you like to obtain your AVS? Mail a copy    7:50 AM  - 8:00 AM     Assessment & Plan     Diarrhea, unspecified type  History of 3 days of watery loose stools with abdominal cramping, decreased appetite and weight loss last week.  Positive known contact to norovirus.  Symptoms have resolved and has been symptom-free for 3 days.  Denies any fevers or other symptoms.  Symptoms consistent with norovirus.  Okay to go back to work without any further testing.  Encourage patient to continue to keep well-hydrated.               See Patient Instructions    Return in about 1 week (around 3/1/2021), or if symptoms worsen or fail to improve.    Carrie Soares, AUDELIA SHERIDAN  [facility]   PAOLA is a 25 year old who presents for the following health issues:    HPI       Diarrhea  Onset/Duration: symptoms started on Tuesday couple days last week - hasn't had any problems for 3 days  Description:       Consistency of stool: watery and loose       Blood in stool: no       Number of loose stools past 24 hours: 1   Progression of Symptoms: improving  Accompanying signs and symptoms:       Fever: no        Nausea/Vomiting: no - last week       Abdominal pain: YES- has gone away x 1 1/2 days ago       Weight loss: YES- 5-6 pounds over the last week       Episodes of constipation: no  History   Ill contacts: YES- mother in law had norovirus last week when she was exposed from work  Recent use of antibiotics: no  Recent travels: no  Recent medication-new or changes(Rx or OTC): no  Precipitating or alleviating factors: None  Therapies tried and outcome: none    Needs letter sent to him for work : [email or web address]    Last stool about a day and a half ago - was more soft/formed   Has had a normal bowel movement since   Back to eating normal   No  other symptoms   Cold chills on Friday when symptoms were the worst         Review of Systems   Constitutional, HEENT, cardiovascular, pulmonary, gi and gu systems are negative, except as otherwise noted.      Objective           Vitals:  No vitals were obtained today due to virtual visit.    Physical Exam   healthy, alert and no distress  PSYCH: Alert and oriented times 3; coherent speech, normal   rate and volume, able to articulate logical thoughts, able   to abstract reason, no tangential thoughts, no hallucinations   or delusions  His affect is normal  RESP: No cough, no audible wheezing, able to talk in full sentences  Remainder of exam unable to be completed due to telephone visits    Diagnostic Test Results:  none            Phone call duration: 10 minutes

## 2021-02-22 NOTE — LETTER
[facility]  [address] [place] [address]  [phone]          February 22, 2021    Savage Roberts                                                                                                                     [id number]  [facility] [address]        To whom it may concern,    Savage was evaluated via virtual visit today for acute illness last week.  Patient had symptoms of acute viral illness other than COVID-19.  He will need to be excused from work the days he missed due to illness and should be able to return to work on next scheduled workday.        Sincerely,       Carrie Strauss, DNP, APRN-CNP

## 2021-03-03 ENCOUNTER — OFFICE VISIT (OUTPATIENT)
Dept: FAMILY MEDICINE | Facility: CLINIC | Age: 26
End: 2021-03-03
Payer: MEDICARE

## 2021-03-03 ENCOUNTER — TELEPHONE (OUTPATIENT)
Dept: FAMILY MEDICINE | Facility: CLINIC | Age: 26
End: 2021-03-03

## 2021-03-03 ENCOUNTER — ANCILLARY PROCEDURE (OUTPATIENT)
Dept: GENERAL RADIOLOGY | Facility: CLINIC | Age: 26
End: 2021-03-03
Attending: NURSE PRACTITIONER
Payer: MEDICARE

## 2021-03-03 VITALS
HEIGHT: 71 IN | RESPIRATION RATE: 18 BRPM | HEART RATE: 66 BPM | SYSTOLIC BLOOD PRESSURE: 120 MMHG | DIASTOLIC BLOOD PRESSURE: 72 MMHG | BODY MASS INDEX: 28.9 KG/M2 | WEIGHT: 206.4 LBS | TEMPERATURE: 98 F | OXYGEN SATURATION: 98 %

## 2021-03-03 DIAGNOSIS — M25.562 CHRONIC PAIN OF LEFT KNEE: Primary | ICD-10-CM

## 2021-03-03 DIAGNOSIS — G89.29 CHRONIC PAIN OF LEFT KNEE: ICD-10-CM

## 2021-03-03 DIAGNOSIS — G89.29 CHRONIC PAIN OF LEFT KNEE: Primary | ICD-10-CM

## 2021-03-03 DIAGNOSIS — M25.562 CHRONIC PAIN OF LEFT KNEE: ICD-10-CM

## 2021-03-03 PROCEDURE — 73560 X-RAY EXAM OF KNEE 1 OR 2: CPT | Mod: LT | Performed by: RADIOLOGY

## 2021-03-03 PROCEDURE — 99214 OFFICE O/P EST MOD 30 MIN: CPT | Performed by: NURSE PRACTITIONER

## 2021-03-03 ASSESSMENT — MIFFLIN-ST. JEOR: SCORE: 1935.41

## 2021-03-03 ASSESSMENT — PAIN SCALES - GENERAL: PAINLEVEL: EXTREME PAIN (8)

## 2021-03-03 NOTE — PROGRESS NOTES
"    Assessment & Plan     Chronic pain of left knee  No acute distress.  With chronic now worsening knee pain, x ray completed today which was unremarkable per AUDELIA Xiong CNP.  Plan to start PT through work and see ortho for further evaluation and plan.  Restrictions provided for work.  Symptomatic care and follow up discussed.  - XR Knee Left 1/2 Views; Future  - Orthopedic & Spine  Referral; Future    56}  Return in about 2 weeks (around 3/17/2021), or if symptoms worsen or fail to improve.    AUDELIA Xiong CNP  [facility]    Feliciano GUEVARA is a 25 year old who presents for the following health issues     HPI       Musculoskeletal problem/pain  Onset/Duration: maybe for 1 year but worse for past 2 months  Description  Location: knee - left  Joint Swelling: no  Redness: no  Pain: YES  Warmth: YES  Intensity:  4-8/10  Progression of Symptoms:  worsening  Accompanying signs and symptoms:   Fevers: no  Numbness/tingling/weakness: YES- tingling in left foot  History  Trauma to the area: no  Recent illness:  no  Previous similar problem: no  Previous evaluation:  no  Precipitating or alleviating factors:  Aggravating factors include: standing, walking and climbing stairs  Therapies tried and outcome: Ibuprofen    Grew a foot and a half in 1 year and had pain-per patient report when he saw ortho as a child he was told he would need an early knee replacement  Pain now keeping him up at night  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      Objective    /72   Pulse 66   Temp 98  F (36.7  C)   Resp 18   Ht 1.791 m (5' 10.5\")   Wt 93.6 kg (206 lb 6.4 oz)   SpO2 98%   BMI 29.20 kg/m    Body mass index is 29.2 " kg/m .  Physical Exam   GENERAL: healthy, alert and no distress  MS: tenderness to palpation distal patella, left knee with limited flexion, internal and external rotation due to discomfort  PSYCH: mentation appears normal, affect normal/bright    Xray - Reviewed and interpreted by me.  Unremarkable.

## 2021-03-03 NOTE — TELEPHONE ENCOUNTER
Patient needs a referral for PT in [place]. If any questions contact patient @ [phone]    Jaclyn Gutierrez Pat. Rep

## 2021-03-03 NOTE — LETTER
[facility]  [address] [place] [id number]  Phone: [phone]  Fax: [phone]      REPORT OF WORK ABILITY      Date: 3/3/2021                     Employee Name: Savage Roberts         YOB: 1995      Diagnosis: chronic left knee pain  Work Related: no         EMPLOYEE IS ABLE TO WORK: Return to work with restrictions from *** to *** - {:[id number]}     RESTRICTIONS IF ANY:     Stand:  Frequently (4-6 hours)  Sit:  Frequently (4-6 hours)  Walk: Occasionally (2-4 hours)  Kneel/[place]:  Not at all (0 hours)  Lift, carry no more than:  No restrictions Frequently (4-6 hours)  Push/Pull:  No restrictionsFrequently (4-6 hours)    OTHER RESTRICTIONS: work limited to 6 hour work days    TREATMENT PLAN/NOTES: change work position for comfort, see physical therapist at work      Mary Martinez, ARVIN

## 2021-03-05 ENCOUNTER — HOSPITAL ENCOUNTER (OUTPATIENT)
Dept: PHYSICAL THERAPY | Facility: CLINIC | Age: 26
Setting detail: THERAPIES SERIES
End: 2021-03-05
Attending: NURSE PRACTITIONER
Payer: COMMERCIAL

## 2021-03-05 DIAGNOSIS — M25.562 CHRONIC PAIN OF LEFT KNEE: ICD-10-CM

## 2021-03-05 DIAGNOSIS — G89.29 CHRONIC PAIN OF LEFT KNEE: ICD-10-CM

## 2021-03-05 PROCEDURE — 97110 THERAPEUTIC EXERCISES: CPT | Mod: GP | Performed by: PHYSICAL THERAPIST

## 2021-03-05 PROCEDURE — 97161 PT EVAL LOW COMPLEX 20 MIN: CPT | Mod: GP | Performed by: PHYSICAL THERAPIST

## 2021-03-05 NOTE — PROGRESS NOTES
[facility]          OUTPATIENT PHYSICAL THERAPY ORTHOPEDIC EVALUATION  PLAN OF TREATMENT FOR OUTPATIENT REHABILITATION  (COMPLETE FOR INITIAL CLAIMS ONLY)  Patient's Last Name, First Name, M.I.  YOB: 1995  Savage Roberts    Provider s Name:  [facility]   Medical Record No.  [id number]   Start of Care Date:  03/05/21   Onset Date:  01/05/21   Type:     _X__PT   ___OT   ___SLP Medical Diagnosis:  Chronic pain of left knee      PT Diagnosis:  L knee pain   Visits from SOC:  1      _________________________________________________________________________________  Plan of Treatment/Functional Goals:  balance training, gait training, manual therapy, neuromuscular re-education, ROM, strengthening, stretching           Goals  Goal Identifier: 1  Goal Description: Patient will be able to walk without a limp.   Target Date: 04/02/21    Goal Identifier: 2  Goal Description: Patient will be able to ascend/descend stairs reciprocal pattern, no rail, without pain or difficulty.   Target Date: 04/30/21    Goal Identifier: 3  Goal Description: Patient will be able to stand for 1 hour without pain or difficulty.   Target Date: 04/30/21    Goal Identifier: 4  Goal Description: Patient will be able to tolerate work day with pain 2/10 or less by end of shift.   Target Date: 04/30/21    Goal Identifier: 5  Goal Description: Patient will be independent with HEP to aid functional recovery.   Target Date: 04/30/21                   Therapy Frequency:  1 time/week  Predicted Duration of Therapy Intervention:  8 weeks    Rizwana Grider, PT                 I CERTIFY THE NEED FOR THESE SERVICES FURNISHED UNDER        THIS PLAN OF TREATMENT AND WHILE UNDER MY CARE     (Physician co-signature of this document indicates review and certification of the therapy plan).                       Certification Date From:  03/05/21   Certification Date To:   04/30/21    Referring Provider:  AUDELIA Xiong CNP    Initial Assessment        See Epic Evaluation Start of Care Date: 03/05/21
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
## 2021-03-10 ENCOUNTER — OFFICE VISIT (OUTPATIENT)
Dept: ORTHOPEDICS | Facility: CLINIC | Age: 26
End: 2021-03-10
Payer: COMMERCIAL

## 2021-03-10 ENCOUNTER — ANCILLARY PROCEDURE (OUTPATIENT)
Dept: GENERAL RADIOLOGY | Facility: CLINIC | Age: 26
End: 2021-03-10
Attending: PEDIATRICS
Payer: COMMERCIAL

## 2021-03-10 VITALS
SYSTOLIC BLOOD PRESSURE: 124 MMHG | WEIGHT: 207 LBS | HEIGHT: 71 IN | DIASTOLIC BLOOD PRESSURE: 76 MMHG | BODY MASS INDEX: 28.98 KG/M2

## 2021-03-10 DIAGNOSIS — M25.562 LEFT ANTERIOR KNEE PAIN: Primary | ICD-10-CM

## 2021-03-10 DIAGNOSIS — M25.562 LEFT ANTERIOR KNEE PAIN: ICD-10-CM

## 2021-03-10 PROCEDURE — 99204 OFFICE O/P NEW MOD 45 MIN: CPT | Performed by: PEDIATRICS

## 2021-03-10 PROCEDURE — 73560 X-RAY EXAM OF KNEE 1 OR 2: CPT | Mod: LT | Performed by: RADIOLOGY

## 2021-03-10 ASSESSMENT — MIFFLIN-ST. JEOR: SCORE: 1938.14

## 2021-03-10 NOTE — PROGRESS NOTES
"ASSESSMENT & PLAN    Savage was seen today for pain.    Diagnoses and all orders for this visit:    Left anterior knee pain  -     XR Knee Left 1/2 Views; Future      This issue is acute and worsening.  We discussed these other possible diagnosis:  - more likely patellar maltracking, also discussed meniscal tear    Plan:  - Today's Plan of Care:  Continue Physical Therapy  Consider patellar taping or patellar stabilizing bracing    -We also discussed other future treatment options:  MRI left knee    Follow Up: 3-4 weeks    Concerning signs and symptoms were reviewed.  The patient expressed understanding of this management plan and all questions were answered at this time.    Mariajose Cowan MD CA  Primary Care Sports Medicine  [facility]    -----  Chief Complaint   Patient presents with     Left Knee - Pain       SUBJECTIVE  Savage Roberts is a/an 25 year old male who is seen in consultation at the request of Teodora Martinez C.N.P. for evaluation of left knee pain.     The patient is seen by themselves.  The patient is left handed    Onset: 3-4 month(s) ago. reports insidious onset without acute precipitating event, pain behind knee cap some locking.    Location of Pain: left anterior knee.  Worsened by: walking, standing, flexion, squatting  Better with: rest.  Treatments tried: ice, ibuprofen and physical therapy  Associated symptoms: weakness of knee and locking or catching    Orthopedic/Surgical history: NO  Social History/Occupation:  for [employer], [employer]    No family history pertinent to patient's problem today.    REVIEW OF SYSTEMS:  Review of Systems  Skin: no bruising, yes swelling  Musculoskeletal: as above  Neurologic: no numbness, paresthesias  Remainder of review of systems is negative including constitutional, CV, pulmonary, GI, except as noted in HPI or medical history.    OBJECTIVE:  /76   Ht 1.791 m (5' 10.5\")   Wt 93.9 kg (207 lb)   BMI 29.28 " kg/m     General: healthy, alert and in no distress  HEENT: no scleral icterus or conjunctival erythema  Skin: no suspicious lesions or rash. No jaundice.  CV: distal perfusion intact  Resp: normal respiratory effort without conversational dyspnea   Psych: normal mood and affect  Gait: normal steady gait with appropriate coordination and balance  Neuro: Normal light sensory exam of lower extremity    Bilateral Knee exam  Inspection:      no effusion, swelling of bruising bilateral    Patella:      Mobility -       normal bilateral       + J Sign bilateral    Tender:      medial patellar border left       medial joint line left       infrapatellar tendon left    Non Tender:      remainder of knee area bilateral    Knee ROM:      Full active and passive ROM with flexion and extension bilateral    Strength:      5-/5 with knee extension left    Special Tests:     positive (+) Rasheed left       neg (-) anterior drawer left       neg (-) posterior drawer left       neg (-) varus at 0 deg and 30 deg left       neg (-) valgus at 0 deg and 30 deg left    Gait:      Normal    Neurovascular:      2+ peripheral pulses bilaterally and brisk capillary refill       sensation grossly intact    RADIOLOGY:  I independently ordered, visualized and reviewed these images with the patient  Patellar view knee XR 3/10/2021 - no arthritis  2 view knee XR 3/3/2021 - no arthritis    Xr Knee Left 1/2 Views  Result Date: 3/10/2021  XR KNEE LT 1 VW 3/10/2021 2:20 PM HISTORY: knee pain; Left anterior knee pain  IMPRESSION: Patellar tangential view appears within normal limits. JODY BECKER MD    Xr Knee Left 1/2 Views  Result Date: 3/3/2021  LEFT KNEE ONE TO TWO VIEWS  3/3/2021 10:54 AM HISTORY: Chronic pain of left knee. COMPARISON: None.   IMPRESSION: Normal joint spacing. No fracture or effusion. NIKI VELASQUEZ MD      Review of the result(s) of each unique test - two XR

## 2021-03-10 NOTE — Clinical Note
Have you tried patellar taping? Let me know if you think he'd benefit from patellar stabilizing brace or needs MRI - we discussed this all

## 2021-03-10 NOTE — LETTER
3/10/2021         RE: Savage Roberts  [address] [place] [address]        Dear Colleague,    Thank you for referring your patient, Savage Roberts, to the [facility] [place]. Please see a copy of my visit note below.    ASSESSMENT & PLAN    Savage was seen today for pain.    Diagnoses and all orders for this visit:    Left anterior knee pain  -     XR Knee Left 1/2 Views; Future      This issue is acute and worsening.  We discussed these other possible diagnosis:  - more likely patellar maltracking, also discussed meniscal tear    Plan:  - Today's Plan of Care:  Continue Physical Therapy  Consider patellar taping or patellar stabilizing bracing    -We also discussed other future treatment options:  MRI left knee    Follow Up: 3-4 weeks    Concerning signs and symptoms were reviewed.  The patient expressed understanding of this management plan and all questions were answered at this time.    Mariajose Cowan MD CA  Primary Care Sports Medicine  [facility]    -----  Chief Complaint   Patient presents with     Left Knee - Pain       SUBJECTIVE  Savage Roberts is a/an 25 year old male who is seen in consultation at the request of Teodora Martinez C.N.P. for evaluation of left knee pain.     The patient is seen by themselves.  The patient is left handed    Onset: 3-4 month(s) ago. reports insidious onset without acute precipitating event, pain behind knee cap some locking.    Location of Pain: left anterior knee.  Worsened by: walking, standing, flexion, squatting  Better with: rest.  Treatments tried: ice, ibuprofen and physical therapy  Associated symptoms: weakness of knee and locking or catching    Orthopedic/Surgical history: NO  Social History/Occupation:  for [employer], [employer]    No family history pertinent to patient's problem today.    REVIEW OF SYSTEMS:  Review of Systems  Skin: no bruising, yes  "swelling  Musculoskeletal: as above  Neurologic: no numbness, paresthesias  Remainder of review of systems is negative including constitutional, CV, pulmonary, GI, except as noted in HPI or medical history.    OBJECTIVE:  /76   Ht 1.791 m (5' 10.5\")   Wt 93.9 kg (207 lb)   BMI 29.28 kg/m     General: healthy, alert and in no distress  HEENT: no scleral icterus or conjunctival erythema  Skin: no suspicious lesions or rash. No jaundice.  CV: distal perfusion intact  Resp: normal respiratory effort without conversational dyspnea   Psych: normal mood and affect  Gait: normal steady gait with appropriate coordination and balance  Neuro: Normal light sensory exam of lower extremity    Bilateral Knee exam  Inspection:      no effusion, swelling of bruising bilateral    Patella:      Mobility -       normal bilateral       + J Sign bilateral    Tender:      medial patellar border left       medial joint line left       infrapatellar tendon left    Non Tender:      remainder of knee area bilateral    Knee ROM:      Full active and passive ROM with flexion and extension bilateral    Strength:      5-/5 with knee extension left    Special Tests:     positive (+) Rasheed left       neg (-) anterior drawer left       neg (-) posterior drawer left       neg (-) varus at 0 deg and 30 deg left       neg (-) valgus at 0 deg and 30 deg left    Gait:      Normal    Neurovascular:      2+ peripheral pulses bilaterally and brisk capillary refill       sensation grossly intact    RADIOLOGY:  I independently ordered, visualized and reviewed these images with the patient  Patellar view knee XR 3/10/2021 - no arthritis  2 view knee XR 3/3/2021 - no arthritis    Xr Knee Left 1/2 Views  Result Date: 3/10/2021  XR KNEE LT 1 VW 3/10/2021 2:20 PM HISTORY: knee pain; Left anterior knee pain  IMPRESSION: Patellar tangential view appears within normal limits. JODY BECKER MD    Xr Knee Left 1/2 Views  Result Date: 3/3/2021  LEFT KNEE " ONE TO TWO VIEWS  3/3/2021 10:54 AM HISTORY: Chronic pain of left knee. COMPARISON: None.   IMPRESSION: Normal joint spacing. No fracture or effusion. NIKI VELASQUEZ MD      Review of the result(s) of each unique test - two XR             Again, thank you for allowing me to participate in the care of your patient.        Sincerely,        Mariajose Cowan MD

## 2021-03-10 NOTE — LETTER
March 10, 2021      Savage S Armando  [address]  [facility] [address]        To Whom It May Concern,     Stephen is under my care for left knee pain.  We are planning for physical therapy with close follow up in 3-4 weeks.      Sincerely,        Mariajose Cowan MD

## 2021-03-10 NOTE — RESULT ENCOUNTER NOTE
These results were discussed during office visit.    Mariajose Cowan MD, CAQ  Primary Care Sports Medicine  [facility]

## 2021-03-10 NOTE — PATIENT INSTRUCTIONS
We discussed these other possible diagnosis:  - more likely patellar maltracking, also discussed meniscal tear    Plan:  - Today's Plan of Care:  Continue Physical Therapy  Consider patellar taping or patellar stabilizing bracing    -We also discussed other future treatment options:  MRI left knee    Follow Up: 3-4 weeks    If you have any further questions for your physician or physician s care team you can call [phone] and use option 3 to leave a voice message. Calls received during business hours will be returned same day.

## 2021-03-11 ENCOUNTER — HOSPITAL ENCOUNTER (OUTPATIENT)
Dept: PHYSICAL THERAPY | Facility: CLINIC | Age: 26
Setting detail: THERAPIES SERIES
End: 2021-03-11
Attending: NURSE PRACTITIONER
Payer: COMMERCIAL

## 2021-03-11 PROCEDURE — 97110 THERAPEUTIC EXERCISES: CPT | Mod: GP | Performed by: PHYSICAL THERAPIST

## 2021-03-11 PROCEDURE — 97112 NEUROMUSCULAR REEDUCATION: CPT | Mod: GP | Performed by: PHYSICAL THERAPIST

## 2021-03-18 ENCOUNTER — TELEPHONE (OUTPATIENT)
Dept: ORTHOPEDICS | Facility: CLINIC | Age: 26
End: 2021-03-18

## 2021-03-18 ENCOUNTER — HOSPITAL ENCOUNTER (OUTPATIENT)
Dept: PHYSICAL THERAPY | Facility: CLINIC | Age: 26
Setting detail: THERAPIES SERIES
End: 2021-03-18
Attending: NURSE PRACTITIONER
Payer: COMMERCIAL

## 2021-03-18 PROCEDURE — 97535 SELF CARE MNGMENT TRAINING: CPT | Mod: GP | Performed by: PHYSICAL THERAPIST

## 2021-03-18 PROCEDURE — 97110 THERAPEUTIC EXERCISES: CPT | Mod: GP | Performed by: PHYSICAL THERAPIST

## 2021-03-18 NOTE — LETTER
March 22, 2021      Savage REBOLLEDO Armando  [address]  [facility] [address]        To Whom It May Concern,     Stephen is under my care for knee pain.  He may return to work with the following restrictions:  - rest and ice as needed    Follow up will be in 2-3 weeks.      Sincerely,        Mariajose Cowan MD

## 2021-03-18 NOTE — LETTER
March 22, 2021      Savage REBOLLEDO Armando  [address]  [facility] [address]        To Whom It May Concern,     Stephen is under my care for knee pain.  He may return to work with the following restrictions:  - rest and ice as needed    Follow up will be in 2-3 weeks.      Sincerely,        Mariajose Cowan MD

## 2021-03-24 NOTE — TELEPHONE ENCOUNTER
Letter is in Epic.  Recommend follow up 2-3 weeks as needed.    Mariajose Cowan MD  
MAYA with detailed information regarding that updated letter had been provided and routed to him via [employer].  He may contact clinic with further questions or concerns.    Allen Carias ATC  
Please call patient.  PT reached out regarding a desire for an updated work note.  Please get more information and specifics from the patient.    Mariajose Cowan MD  
Reason for Call:  Other Form    Detailed comments: Pt's Employer responding with another form to give to Pt's PCP that is unfortunately a PDF file and Pt cannot upload that form to  [employer] and would like a call back for further assistance.    Phone Number Patient can be reached at: Home number on file [phone] (home)    Best Time: anytime    Can we leave a detailed message on this number? YES    Call taken on 3/24/2021 at 12:09 PM by Genaro Vann          
Spoke with patient on the phone and provided the patient the [facility] fax number to try to fax in the form. Patient will include his employers fax number to return it when it is completed    Augie Tapia ATC, LAT    
Wants current restrictions lifted to just ice as needed. Currently company has not let him go back to work due to current work restrictions he has. Discussed being seen again for re-evaluation to determine appropriate work restrictions. Marium Encarnacion, ATC    
Speaking Coherently

## 2021-04-02 ENCOUNTER — HOSPITAL ENCOUNTER (OUTPATIENT)
Dept: PHYSICAL THERAPY | Facility: CLINIC | Age: 26
Setting detail: THERAPIES SERIES
End: 2021-04-02
Attending: NURSE PRACTITIONER
Payer: MEDICARE

## 2021-04-02 PROCEDURE — 97110 THERAPEUTIC EXERCISES: CPT | Mod: GP | Performed by: PHYSICAL THERAPIST

## 2021-04-02 NOTE — PROGRESS NOTES
PHYSICAL THERAPY PROGRESS NOTE  04/02/21 1100   Signing Clinician's Name / Credentials   Signing clinician's name / credentials Rizwana Grider, PT, DPT, OCS   Session Number   Session Number 4 medicare   Progress Note/Recertification   Progress Note Due Date 04/30/21   Recertification Due Date 04/30/21   Adult Goals   PT Ortho Eval Goals 1;2;3;4;5   Ortho Goal 1   Goal Identifier 1   Goal Description Patient will be able to walk without a limp.    Target Date 04/02/21   Date Met 04/02/21   Ortho Goal 2   Goal Identifier 2   Goal Description Patient will be able to ascend/descend stairs reciprocal pattern, no rail, without pain or difficulty.    Target Date 04/30/21   Date Met 04/02/21   Ortho Goal 3   Goal Identifier 3   Goal Description Patient will be able to stand for 1 hour without pain or difficulty.    Target Date 04/30/21   Date Met 04/02/21   Ortho Goal 4   Goal Identifier 4   Goal Description Patient will be able to tolerate work day with pain 2/10 or less by end of shift.    Target Date 04/30/21   Date Met   (not yet returned)   Ortho Goal 5   Goal Identifier 5   Goal Description Patient will be independent with HEP to aid functional recovery.    Target Date 04/30/21   Date Met 04/02/21   Subjective Report   Subjective Report Knee is a lot better. Aches here and there but is a lot better.    Objective Measures   Objective Measures Objective Measure 1;Objective Measure 2   Objective Measure 1   Objective Measure Gait   Details Normal and without pain   Objective Measure 2   Objective Measure Knee ROM   Details Full and pain free   Treatment Interventions   Interventions Therapeutic Procedure/Exercise;Neuromuscular Re-education;Self Care/Home Management   Therapeutic Procedure/exercise   Therapeutic Procedures: strength, endurance, ROM, flexibillity minutes (93098) 30   Skilled Intervention HEP instruction, strengthening, flexibility to decrease pain and improve function   Patient Response much improved  "strength on step, pain free   Treatment Detail prog LAQ to BlueTB x 20. band walks GTB around ankles side/side 20' x 2 and forwards and backwards. 6\" lateral step down x 10.  side planks 30 sec B. prone plank 30 sec.    Education   Learner Patient   Readiness Eager   Method Booklet/handout;Explanation;Demonstration   Response Verbalizes Understanding;Demonstrates Understanding   Plan   Homework PTRX   Home program [id number]   Updates to plan of care goals met, hold chart   Plan for next session pt progressing very well and has very little pain. His ROM is normalized and strength is improving. He is independent with HEP feels comfortable to continue on own. Will hold chart in case of difficulty with return to work, otherwise discharge after 30 days of no return   Total Session Time   Timed Code Treatment Minutes 30   Total Treatment Time (sum of timed and untimed services) 30   AMBULATORY CLINICS ONLY-MEDICAL AND TREATMENT DIAGNOSIS   PT Diagnosis L knee pain       Please contact me with any questions or concerns.  Thank you for your referral.    Rizwana Grider, PT, DPT, OCS  Physical Therapist, Orthopedic Certified Specialist    Critical access hospital  [address] [place] [id number]  [email or web address]   Office: [phone]   Employed by [facility]    "

## 2021-04-04 ENCOUNTER — HEALTH MAINTENANCE LETTER (OUTPATIENT)
Age: 26
End: 2021-04-04

## 2021-04-07 ENCOUNTER — OFFICE VISIT (OUTPATIENT)
Dept: ORTHOPEDICS | Facility: CLINIC | Age: 26
End: 2021-04-07
Payer: MEDICARE

## 2021-04-07 VITALS
WEIGHT: 224 LBS | HEIGHT: 71 IN | SYSTOLIC BLOOD PRESSURE: 126 MMHG | DIASTOLIC BLOOD PRESSURE: 76 MMHG | BODY MASS INDEX: 31.36 KG/M2

## 2021-04-07 DIAGNOSIS — M22.8X2 MALTRACKING OF LEFT PATELLA: ICD-10-CM

## 2021-04-07 DIAGNOSIS — M25.562 LEFT ANTERIOR KNEE PAIN: Primary | ICD-10-CM

## 2021-04-07 PROCEDURE — 99213 OFFICE O/P EST LOW 20 MIN: CPT | Performed by: PEDIATRICS

## 2021-04-07 ASSESSMENT — MIFFLIN-ST. JEOR: SCORE: 2018.19

## 2021-04-07 NOTE — LETTER
4/7/2021         RE: Savage Roberts  [address] [place] [address]        Dear Colleague,    Thank you for referring your patient, Savage Roberts, to the [facility] [place]. Please see a copy of my visit note below.    Sports Medicine Clinic Visit - Interim History April 7, 2021    PCP: No Ref-Primary, Physician    Savage Roberts is a 26 year old male who is seen in f/u up for Left knee pain. Since last visit on 3/10/21, patient has been doing well. PT has been helping, the knee brace and taping is feeling good. He is ready to get back to work.    Symptoms are better with: taping, PT  Symptoms are worse with: nothing really, tape helps a lot    Social History: AN    Review of Systems  Skin: no bruising, no swelling  Musculoskeletal: as above  Neurologic: no numbness, paresthesias  Remainder of review of systems is negative including constitutional, CV, pulmonary, GI, except as noted in HPI or medical history.    Patient's current problem list, past medical and surgical history, and family history were reviewed.    Patient Active Problem List   Diagnosis     Duane's syndrome     Microcytosis     Past Medical History:   Diagnosis Date     Anxiety disorder      Autistic disorder      Bipolar affective disorder (H)      Duane syndrome      Gastro-oesophageal reflux disease      OCD (obsessive compulsive disorder)      PTSD (post-traumatic stress disorder)      Strabismus      Past Surgical History:   Procedure Laterality Date     NISSEN FUNDOPLICATION  1/2010     RECESSION RESECTION (REPAIR STRABISMUS) BILATERAL Bilateral 1/20/2015    Procedure: RECESSION RESECTION (REPAIR STRABISMUS) BILATERAL;  Surgeon: Shaun Storey MD;  Location: [facility]     SEPTOPLASTY  12/2011     STRABISMUS SURGERY  6/27/13    RLRc 5 and LLRc 4mm (Daniel)     Family History   Problem Relation Age of Onset     Strabismus Maternal Grandmother         no sx, no glasses, no patching      "Strabismus Other         strab sx, glasses/strab sx     Neurologic Disorder Mother         nystagmus, bipolar, narcolepsy     Multiple Sclerosis Maternal Grandfather      Multiple Sclerosis Maternal Uncle        Objective  /76   Ht 1.803 m (5' 11\")   Wt 101.6 kg (224 lb)   BMI 31.24 kg/m      General: healthy, alert and in no distress  HEENT: no scleral icterus or conjunctival erythema  Skin: no suspicious lesions or rash. No jaundice.  CV: distal perfusion intact  Resp: normal respiratory effort without conversational dyspnea   Psych: normal mood and affect  Gait: normal steady gait with appropriate coordination and balance  Neuro: Normal light sensory exam of lower extremity     Bilateral Knee exam  Inspection:      no effusion, swelling of bruising bilateral     Patella:      Mobility -       normal bilateral       + J Sign bilateral     Tender:      none     Non Tender:      remainder of knee area bilateral     Knee ROM:      Full active and passive ROM with flexion and extension bilateral     Strength:      5/5 with knee extension left     Special Tests:     neg (-) Rasheed left       neg (-) anterior drawer left       neg (-) posterior drawer left       neg (-) varus at 0 deg and 30 deg left       neg (-) valgus at 0 deg and 30 deg left     Gait:      Normal     Neurovascular:      2+ peripheral pulses bilaterally and brisk capillary refill       sensation grossly intact    Radiology  I ordered, visualized and reviewed these images with the patient  LEFT KNEE ONE TO TWO VIEWS  3/3/2021 10:54 AM   HISTORY: Chronic pain of left knee.   COMPARISON: None.                                                        IMPRESSION: Normal joint spacing. No fracture or effusion.  NIKI VELASQUEZ MD    XR KNEE LT 1 VW 3/10/2021 2:20 PM   HISTORY: knee pain; Left anterior knee pain                                                         IMPRESSION: Patellar tangential view appears within normal limits.  JODY BECKER, " MD    Assessment:  1. Left anterior knee pain    2. Maltracking of left patella      Symptoms improved, will return to work. Follow up if needed.    Plan:  - Today's Plan of Care:  Continue Home Exercise Program  Brace and Tape as needed    -We also discussed other future treatment options:  MRI left knee    Follow Up: as needed    Concerning signs and symptoms were reviewed.  The patient expressed understanding of this management plan and all questions were answered at this time.    Mariajose Cowan MD [facility]  Sports Medicine Physician  [facility]        Again, thank you for allowing me to participate in the care of your patient.        Sincerely,        Mariajose Cowan MD

## 2021-04-07 NOTE — PATIENT INSTRUCTIONS
Plan:  - Today's Plan of Care:  Continue Home Exercise Program  Brace and Tape as needed    -We also discussed other future treatment options:  MRI left knee    Follow Up: as needed    If you have any further questions for your physician or physician s care team you can call [phone] and use option 3 to leave a voice message. Calls received during business hours will be returned same day.

## 2021-04-07 NOTE — PROGRESS NOTES
"Sports Medicine Clinic Visit - Interim History April 7, 2021    PCP: No Ref-Primary, Physician    Savage Roberts is a 26 year old male who is seen in f/u up for Left knee pain. Since last visit on 3/10/21, patient has been doing well. PT has been helping, the knee brace and taping is feeling good. He is ready to get back to work.    Symptoms are better with: taping, PT  Symptoms are worse with: nothing really, tape helps a lot    Social History: AN    Review of Systems  Skin: no bruising, no swelling  Musculoskeletal: as above  Neurologic: no numbness, paresthesias  Remainder of review of systems is negative including constitutional, CV, pulmonary, GI, except as noted in HPI or medical history.    Patient's current problem list, past medical and surgical history, and family history were reviewed.    Patient Active Problem List   Diagnosis     Duane's syndrome     Microcytosis     Past Medical History:   Diagnosis Date     Anxiety disorder      Autistic disorder      Bipolar affective disorder (H)      Duane syndrome      Gastro-oesophageal reflux disease      OCD (obsessive compulsive disorder)      PTSD (post-traumatic stress disorder)      Strabismus      Past Surgical History:   Procedure Laterality Date     NISSEN FUNDOPLICATION  1/2010     RECESSION RESECTION (REPAIR STRABISMUS) BILATERAL Bilateral 1/20/2015    Procedure: RECESSION RESECTION (REPAIR STRABISMUS) BILATERAL;  Surgeon: Shaun Storey MD;  Location: [facility]     SEPTOPLASTY  12/2011     STRABISMUS SURGERY  6/27/13    RLRc 5 and LLRc 4mm (Daniel)     Family History   Problem Relation Age of Onset     Strabismus Maternal Grandmother         no sx, no glasses, no patching     Strabismus Other         strab sx, glasses/strab sx     Neurologic Disorder Mother         nystagmus, bipolar, narcolepsy     Multiple Sclerosis Maternal Grandfather      Multiple Sclerosis Maternal Uncle        Objective  /76   Ht 1.803 m (5' 11\")   Wt 101.6 kg (224 " lb)   BMI 31.24 kg/m      General: healthy, alert and in no distress  HEENT: no scleral icterus or conjunctival erythema  Skin: no suspicious lesions or rash. No jaundice.  CV: distal perfusion intact  Resp: normal respiratory effort without conversational dyspnea   Psych: normal mood and affect  Gait: normal steady gait with appropriate coordination and balance  Neuro: Normal light sensory exam of lower extremity     Bilateral Knee exam  Inspection:      no effusion, swelling of bruising bilateral     Patella:      Mobility -       normal bilateral       + J Sign bilateral     Tender:      none     Non Tender:      remainder of knee area bilateral     Knee ROM:      Full active and passive ROM with flexion and extension bilateral     Strength:      5/5 with knee extension left     Special Tests:     neg (-) Rasheed left       neg (-) anterior drawer left       neg (-) posterior drawer left       neg (-) varus at 0 deg and 30 deg left       neg (-) valgus at 0 deg and 30 deg left     Gait:      Normal     Neurovascular:      2+ peripheral pulses bilaterally and brisk capillary refill       sensation grossly intact    Radiology  I ordered, visualized and reviewed these images with the patient  LEFT KNEE ONE TO TWO VIEWS  3/3/2021 10:54 AM   HISTORY: Chronic pain of left knee.   COMPARISON: None.                                                        IMPRESSION: Normal joint spacing. No fracture or effusion.  NIKI VELASQUEZ MD    XR KNEE LT 1 VW 3/10/2021 2:20 PM   HISTORY: knee pain; Left anterior knee pain                                                         IMPRESSION: Patellar tangential view appears within normal limits.  JODY BECKER MD    Assessment:  1. Left anterior knee pain    2. Maltracking of left patella      Symptoms improved, will return to work. Follow up if needed.    Plan:  - Today's Plan of Care:  Continue Home Exercise Program  Brace and Tape as needed    -We also discussed other future  treatment options:  MRI left knee    Follow Up: as needed    Concerning signs and symptoms were reviewed.  The patient expressed understanding of this management plan and all questions were answered at this time.    Mariajose Cowan MD [facility]  Sports Medicine Physician  [facility]

## 2021-04-21 ENCOUNTER — TELEPHONE (OUTPATIENT)
Dept: ORTHOPEDICS | Facility: CLINIC | Age: 26
End: 2021-04-21

## 2021-04-21 NOTE — TELEPHONE ENCOUNTER
Reason for Call:  Other appointment    Detailed comments: Patient would like an appt for left knee pain, he is out of work due to limping     Phone Number Patient can be reached at: Cell number on file:    Telephone Information:   Mobile [phone]       Best Time: anytime past noon    Can we leave a detailed message on this number? YES    Call taken on 4/21/2021 at 2:45 PM by Jerrod Stapleton

## 2021-04-22 NOTE — TELEPHONE ENCOUNTER
Per scheduling request message for scheduling team it sounds like patient is requesting a MRI order to be able to complete an MRI of his left knee prior to his appointment with Dr Cowan. Called patient and left  to discuss this. Per chart review an MRI of the left knee was discussed at his last visit with Dr Cowan. Please verify this is what patient wants.    Augie Tapia ATC, LAT

## 2021-04-26 ENCOUNTER — OFFICE VISIT (OUTPATIENT)
Dept: ORTHOPEDICS | Facility: CLINIC | Age: 26
End: 2021-04-26
Payer: COMMERCIAL

## 2021-04-26 DIAGNOSIS — M25.562 LEFT KNEE PAIN, UNSPECIFIED CHRONICITY: ICD-10-CM

## 2021-04-26 DIAGNOSIS — M22.8X2 MALTRACKING OF LEFT PATELLA: Primary | ICD-10-CM

## 2021-04-26 PROCEDURE — 99207 PR NO CHARGE LOS: CPT | Performed by: PEDIATRICS

## 2021-04-26 NOTE — LETTER
4/26/2021         RE: Savage Roberts  [address] [place] [address]        Dear Colleague,    Thank you for referring your patient, Savage Roberts, to the [facility] [place]. Please see a copy of my visit note below.    Patient arrived, but per the phone messages was wanting to get an MRI to find out more information with his left knee. Patient did not see Dr. Cowan today.       Again, thank you for allowing me to participate in the care of your patient.        Sincerely,        Mariajose Cowan MD

## 2021-04-26 NOTE — PROGRESS NOTES
Patient arrived, but per the phone messages was wanting to get an MRI to find out more information with his left knee. Patient did not see Dr. Cowan today.

## 2021-04-27 NOTE — TELEPHONE ENCOUNTER
MRI ordered by Dr Cowan yesterday 4/28/21. Patient is not yet scheduled for MRI. [facility] for patient informing him the MRI was ordered and he shoulder call [phone] to schedule the MRI. [facility] phone number was left on  if he had further questions.    Augie Tapia, CURRY, LAT

## 2021-04-28 ENCOUNTER — HOSPITAL ENCOUNTER (OUTPATIENT)
Dept: MRI IMAGING | Facility: CLINIC | Age: 26
Discharge: HOME | End: 2021-04-28
Attending: PEDIATRICS | Admitting: PEDIATRICS
Payer: MEDICARE

## 2021-04-28 DIAGNOSIS — M22.8X2 MALTRACKING OF LEFT PATELLA: ICD-10-CM

## 2021-04-28 DIAGNOSIS — M25.562 LEFT KNEE PAIN, UNSPECIFIED CHRONICITY: ICD-10-CM

## 2021-04-28 PROCEDURE — 73721 MRI JNT OF LWR EXTRE W/O DYE: CPT | Mod: 26 | Performed by: RADIOLOGY

## 2021-04-28 PROCEDURE — 73721 MRI JNT OF LWR EXTRE W/O DYE: CPT | Mod: LT

## 2021-05-07 ENCOUNTER — OFFICE VISIT (OUTPATIENT)
Dept: ORTHOPEDICS | Facility: CLINIC | Age: 26
End: 2021-05-07
Payer: MEDICARE

## 2021-05-07 VITALS
DIASTOLIC BLOOD PRESSURE: 88 MMHG | HEIGHT: 71 IN | BODY MASS INDEX: 31.36 KG/M2 | WEIGHT: 224 LBS | SYSTOLIC BLOOD PRESSURE: 137 MMHG

## 2021-05-07 DIAGNOSIS — G89.29 CHRONIC PAIN OF LEFT KNEE: Primary | ICD-10-CM

## 2021-05-07 DIAGNOSIS — M25.562 LEFT KNEE PAIN, UNSPECIFIED CHRONICITY: ICD-10-CM

## 2021-05-07 DIAGNOSIS — M22.8X2 MALTRACKING OF LEFT PATELLA: ICD-10-CM

## 2021-05-07 DIAGNOSIS — M25.562 CHRONIC PAIN OF LEFT KNEE: Primary | ICD-10-CM

## 2021-05-07 PROCEDURE — 99213 OFFICE O/P EST LOW 20 MIN: CPT | Performed by: PEDIATRICS

## 2021-05-07 ASSESSMENT — MIFFLIN-ST. JEOR: SCORE: 2018.19

## 2021-05-07 NOTE — PATIENT INSTRUCTIONS
We discussed these other possible diagnosis: possible lateral meniscal tear    Plan:  - Today's Plan of Care:  Follow up with physical therapy  Consider inserts Superfeet - size 11  Continue patellar stabilizing brace and taping if needed  Referral to Orthopedic Surgery    Follow Up: as needed    If you have any further questions for your physician or physician s care team you can call [phone] and use option 3 to leave a voice message. Calls received during business hours will be returned same day.

## 2021-05-07 NOTE — LETTER
5/7/2021         RE: Savage Roberts  [address] [place] [address]        Dear Colleague,    Thank you for referring your patient, Savage Roberts, to the [facility] [place]. Please see a copy of my visit note below.    ASSESSMENT & PLAN    Savage was seen today for follow up and pain.    Diagnoses and all orders for this visit:    Chronic pain of left knee  -     Orthopedic & Spine  Referral; Future    Maltracking of left patella  -     Orthopedic & Spine  Referral; Future    Left knee pain, unspecified chronicity  -     Orthopedic & Spine  Referral; Future      This issue is acute on chronic and Unchanged.    We discussed these other possible diagnosis: possible lateral meniscal tear  - Lateral meniscal tear less likely the cause of current symptoms, more likely patellar maltracking. Discussed close follow up with physical therapy, consideratio of bracing, could consider shoe inserts. Could obtain opinion from orthopedic surgery, however, I suspect meniscal tear is not causing current symptoms.    Plan:  - Today's Plan of Care:  Follow up with physical therapy  Consider inserts Superfeet - size 11  Continue patellar stabilizing brace and taping if needed  Referral to Orthopedic Surgery    Follow Up: as needed    Concerning signs and symptoms were reviewed.  The patient expressed understanding of this management plan and all questions were answered at this time.    Mariajose Cowan MD [facility]  Sports Medicine Physician  [facility]      SUBJECTIVE- Interim History May 7, 2021    Chief Complaint   Patient presents with     Left Knee - Follow Up, Pain       Savage Roberts is a 26 year old male who is seen in f/u up for    Chronic pain of left knee  Maltracking of left patella  Left knee pain, unspecified chronicity.  Since last visit on 4/26/21, patient has been feeling ok. The exercises are not helping a lot.  He  "notices his pain is more in the anterior low leg. Whenever he uses his knee a lot it starts flaring up and hurting a lot. Has been trying taping. Here to review MRI results today.  - Now ~ 8 weeks from initial injury    Worsened by: bending, stairs, crouching   Better with: ice  Treatments tried: rest/activity avoidance, ice, ibuprofen, home exercises and casting/splinting/bracing  Associated symptoms:  tingling, locking or catching and \"coldness\"    The patient is seen with significant other.  The patient is Right handed    Orthopedic/Surgical history: NO  Social History/Occupation: unemployed    No family history pertinent to patient's problem today.    REVIEW OF SYSTEMS:  Review of Systems  Skin: no bruising, no swelling  Musculoskeletal: as above  Neurologic: no numbness, paresthesias  Remainder of review of systems is negative including constitutional, CV, pulmonary, GI, except as noted in HPI or medical history.    OBJECTIVE:  /88   Ht 1.803 m (5' 11\")   Wt 101.6 kg (224 lb)   BMI 31.24 kg/m       General: healthy, alert and in no distress  HEENT: no scleral icterus or conjunctival erythema  Skin: no suspicious lesions or rash. No jaundice.  CV: distal perfusion intact  Resp: normal respiratory effort without conversational dyspnea   Psych: normal mood and affect  Gait: normal steady gait with appropriate coordination and balance  Neuro: Normal light sensory exam of lower extremity     Bilateral Knee exam  Inspection:      no effusion, swelling of bruising bilateral     Patella:      Mobility -       normal bilateral       + J Sign bilateral     Tender:      peripatellar tenderness, mild medial joint line     Non Tender:      remainder of knee area bilateral     Knee ROM:      Full active and passive ROM with flexion and extension bilateral     Strength:      5-/5 with knee extension left     Special Tests:     pain with Rasheed left       neg (-) anterior drawer left       neg (-) posterior drawer " left       neg (-) varus at 0 deg and 30 deg left       neg (-) valgus at 0 deg and 30 deg left     Gait:      Normal     Neurovascular:      2+ peripheral pulses bilaterally and brisk capillary refill       sensation grossly intact       RADIOLOGY:  Final results and radiologist's interpretation, available in the [facility] health record.  Images were reviewed with the patient in the office today.  My personal interpretation of the performed imaging: MRI left knee 4/28/2021 - possible lateral meniscus blunting, no ligamentous injury      Results for orders placed or performed during the hospital encounter of 04/28/21   MR Knee Left w/o Contrast    Narrative    MR left knee without contrast 4/28/2021 8:06 PM    Techniques: Multiplanar multisequence imaging of the left knee was  obtained without administration of intra-articular or intravenous  contrast using routing protocol.    History: eval meniscal tear; Maltracking of left patella; Left knee  pain, unspecified chronicity    Comparison: X-ray 3/10/2021 and 3/3/2021    Findings:    MENISCI:  Medial meniscus: Intact.  Lateral meniscus: Blunting of the inner margin of the body of the  lateral meniscus. No discrete tear.    LIGAMENTS  Cruciate ligaments: Intact.  Medial supporting structures: Intact.  Lateral supporting structures: Intact.    EXTENSOR MECHANISM  Intact.    FLUID  Joint fluid is at the upper limits of normal. No substantial Baker's  cyst.    OSSEOUS and ARTICULAR STRUCTURES  Bones: No fracture, contusion, or osseous lesion is seen.    Patellofemoral compartment: No hyaline cartilage disease. The patellar  retinacula are intact. Insall Salvati ratio of 1.31. No edema seen in  the superolateral Hoffa's fat pad.    Medial compartment: No hyaline cartilage disease.    Lateral compartment: No hyaline cartilage disease.    ANCILLARY FINDINGS  None.      Impression    Impression:  1. Free edge blunting of the lateral meniscus without a discrete tear  within either  the medial or lateral meniscus.  2. The cruciate ligaments as well as the medial and lateral supporting  structures are intact.    I have personally reviewed the examination and initial interpretation  and I agree with the findings.    JUTTA ELLERMANN, MD         Review of the result(s) of each unique test - MRI           Again, thank you for allowing me to participate in the care of your patient.        Sincerely,        Mariajose Cowan MD

## 2021-05-07 NOTE — PROGRESS NOTES
ASSESSMENT & PLAN    Savage was seen today for follow up and pain.    Diagnoses and all orders for this visit:    Chronic pain of left knee  -     Orthopedic & Spine  Referral; Future    Maltracking of left patella  -     Orthopedic & Spine  Referral; Future    Left knee pain, unspecified chronicity  -     Orthopedic & Spine  Referral; Future      This issue is acute on chronic and Unchanged.    We discussed these other possible diagnosis: possible lateral meniscal tear  - Lateral meniscal tear less likely the cause of current symptoms, more likely patellar maltracking. Discussed close follow up with physical therapy, consideratio of bracing, could consider shoe inserts. Could obtain opinion from orthopedic surgery, however, I suspect meniscal tear is not causing current symptoms.    Plan:  - Today's Plan of Care:  Follow up with physical therapy  Consider inserts Superfeet - size 11  Continue patellar stabilizing brace and taping if needed  Referral to Orthopedic Surgery    Follow Up: as needed    Concerning signs and symptoms were reviewed.  The patient expressed understanding of this management plan and all questions were answered at this time.    Mariajose Cowan MD [facility]  Sports Medicine Physician  [facility]      SUBJECTIVE- Interim History May 7, 2021    Chief Complaint   Patient presents with     Left Knee - Follow Up, Pain       Savage Roberts is a 26 year old male who is seen in f/u up for    Chronic pain of left knee  Maltracking of left patella  Left knee pain, unspecified chronicity.  Since last visit on 4/26/21, patient has been feeling ok. The exercises are not helping a lot.  He notices his pain is more in the anterior low leg. Whenever he uses his knee a lot it starts flaring up and hurting a lot. Has been trying taping. Here to review MRI results today.  - Now ~ 8 weeks from initial injury    Worsened by: bending, stairs, crouching   Better with:  "ice  Treatments tried: rest/activity avoidance, ice, ibuprofen, home exercises and casting/splinting/bracing  Associated symptoms:  tingling, locking or catching and \"coldness\"    The patient is seen with significant other.  The patient is Right handed    Orthopedic/Surgical history: NO  Social History/Occupation: unemployed    No family history pertinent to patient's problem today.    REVIEW OF SYSTEMS:  Review of Systems  Skin: no bruising, no swelling  Musculoskeletal: as above  Neurologic: no numbness, paresthesias  Remainder of review of systems is negative including constitutional, CV, pulmonary, GI, except as noted in HPI or medical history.    OBJECTIVE:  /88   Ht 1.803 m (5' 11\")   Wt 101.6 kg (224 lb)   BMI 31.24 kg/m       General: healthy, alert and in no distress  HEENT: no scleral icterus or conjunctival erythema  Skin: no suspicious lesions or rash. No jaundice.  CV: distal perfusion intact  Resp: normal respiratory effort without conversational dyspnea   Psych: normal mood and affect  Gait: normal steady gait with appropriate coordination and balance  Neuro: Normal light sensory exam of lower extremity     Bilateral Knee exam  Inspection:      no effusion, swelling of bruising bilateral     Patella:      Mobility -       normal bilateral       + J Sign bilateral     Tender:      peripatellar tenderness, mild medial joint line     Non Tender:      remainder of knee area bilateral     Knee ROM:      Full active and passive ROM with flexion and extension bilateral     Strength:      5-/5 with knee extension left     Special Tests:     pain with Rasheed left       neg (-) anterior drawer left       neg (-) posterior drawer left       neg (-) varus at 0 deg and 30 deg left       neg (-) valgus at 0 deg and 30 deg left     Gait:      Normal     Neurovascular:      2+ peripheral pulses bilaterally and brisk capillary refill       sensation grossly intact       RADIOLOGY:  Final results and " radiologist's interpretation, available in the [facility] health record.  Images were reviewed with the patient in the office today.  My personal interpretation of the performed imaging: MRI left knee 4/28/2021 - possible lateral meniscus blunting, no ligamentous injury      Results for orders placed or performed during the hospital encounter of 04/28/21   MR Knee Left w/o Contrast    Narrative    MR left knee without contrast 4/28/2021 8:06 PM    Techniques: Multiplanar multisequence imaging of the left knee was  obtained without administration of intra-articular or intravenous  contrast using routing protocol.    History: eval meniscal tear; Maltracking of left patella; Left knee  pain, unspecified chronicity    Comparison: X-ray 3/10/2021 and 3/3/2021    Findings:    MENISCI:  Medial meniscus: Intact.  Lateral meniscus: Blunting of the inner margin of the body of the  lateral meniscus. No discrete tear.    LIGAMENTS  Cruciate ligaments: Intact.  Medial supporting structures: Intact.  Lateral supporting structures: Intact.    EXTENSOR MECHANISM  Intact.    FLUID  Joint fluid is at the upper limits of normal. No substantial Baker's  cyst.    OSSEOUS and ARTICULAR STRUCTURES  Bones: No fracture, contusion, or osseous lesion is seen.    Patellofemoral compartment: No hyaline cartilage disease. The patellar  retinacula are intact. Insall Salvati ratio of 1.31. No edema seen in  the superolateral Hoffa's fat pad.    Medial compartment: No hyaline cartilage disease.    Lateral compartment: No hyaline cartilage disease.    ANCILLARY FINDINGS  None.      Impression    Impression:  1. Free edge blunting of the lateral meniscus without a discrete tear  within either the medial or lateral meniscus.  2. The cruciate ligaments as well as the medial and lateral supporting  structures are intact.    I have personally reviewed the examination and initial interpretation  and I agree with the findings.    JUTTA ELLERMANN, MD          Review of the result(s) of each unique test - MRI

## 2021-05-12 ENCOUNTER — OFFICE VISIT (OUTPATIENT)
Dept: ORTHOPEDICS | Facility: CLINIC | Age: 26
End: 2021-05-12
Payer: MEDICARE

## 2021-05-12 VITALS
WEIGHT: 242 LBS | DIASTOLIC BLOOD PRESSURE: 86 MMHG | HEIGHT: 71 IN | BODY MASS INDEX: 33.88 KG/M2 | SYSTOLIC BLOOD PRESSURE: 121 MMHG | HEART RATE: 77 BPM

## 2021-05-12 DIAGNOSIS — M25.562 CHRONIC PAIN OF LEFT KNEE: Primary | ICD-10-CM

## 2021-05-12 DIAGNOSIS — G89.29 CHRONIC PAIN OF LEFT KNEE: Primary | ICD-10-CM

## 2021-05-12 PROCEDURE — 20610 DRAIN/INJ JOINT/BURSA W/O US: CPT | Mod: LT | Performed by: ORTHOPAEDIC SURGERY

## 2021-05-12 PROCEDURE — 99203 OFFICE O/P NEW LOW 30 MIN: CPT | Mod: 25 | Performed by: ORTHOPAEDIC SURGERY

## 2021-05-12 RX ORDER — METHYLPREDNISOLONE ACETATE 80 MG/ML
80 INJECTION, SUSPENSION INTRA-ARTICULAR; INTRALESIONAL; INTRAMUSCULAR; SOFT TISSUE
Status: DISCONTINUED | OUTPATIENT
Start: 2021-05-12 | End: 2022-04-22

## 2021-05-12 RX ORDER — BUPIVACAINE HYDROCHLORIDE 2.5 MG/ML
4 INJECTION, SOLUTION INFILTRATION; PERINEURAL
Status: DISCONTINUED | OUTPATIENT
Start: 2021-05-12 | End: 2022-04-22

## 2021-05-12 RX ADMIN — METHYLPREDNISOLONE ACETATE 80 MG: 80 INJECTION, SUSPENSION INTRA-ARTICULAR; INTRALESIONAL; INTRAMUSCULAR; SOFT TISSUE at 15:24

## 2021-05-12 RX ADMIN — BUPIVACAINE HYDROCHLORIDE 4 ML: 2.5 INJECTION, SOLUTION INFILTRATION; PERINEURAL at 15:24

## 2021-05-12 ASSESSMENT — MIFFLIN-ST. JEOR: SCORE: 2099.83

## 2021-05-12 ASSESSMENT — PAIN SCALES - GENERAL: PAINLEVEL: SEVERE PAIN (6)

## 2021-05-12 NOTE — PROGRESS NOTES
"CHIEF COMPLAINT:   Chief Complaint   Patient presents with     Left Knee - Pain     Chronic Left Knee Pain - XR 3/10/21 and MR 4/28/21 Pain began approximately 6 months ago without injury. Pain is described as deep within the knee and is worse with prolonged standing. He has tried PHYSICAL THERAPY with minimal relief.      Savage Roberts is seen today in the [facility] for evaluation of left knee pain at the request of Dr. Mariajose Cowan         HISTORY OF PRESENT ILLNESS    Savage Roberts is a 26 year old male seen for evaluation of ongoing left knee pain with no known injury.   Pain has been present for 6 months. Locates pain \"in the knee\", points anterolateral, worse with weight bearing activities and prolonged standing, but has pain at rest, occasional at night. Some new pain, a little, in the back as well.    Treatment with Physical Therapy with minimal relief. Worn patella brace and taping which helped for the first week or so, but after a week with work started to bother again. Takes ibuprofen.    Had knee pain in the past with rapid growth and told due to growing.    Occasional numbness and tingling left lower extremity ankle to toes, as well as left upper extremity.    Present symptoms: pain anteriorly, pain sharp, shooting, dull/achy , mild pain.    Pain severity: 5/10  Frequency of symptoms: are constant  Exacerbating Factors: weight bearing, stairs, [id number], prolonged standing  Relieving Factors: rest, sitting, positional changes  Night Pain: Yes  Pain while at rest: Yes   Numbness or tingling: No   Patient has tried:     NSAIDS: Yes      Physical Therapy: Yes      Activity modification: Yes      Bracing: Yes      Injections: No      Ice: Yes      Assistive device:  No     Other: taping      Other PMH:  has a past medical history of Anxiety disorder, Autistic disorder, Bipolar affective disorder (H), Duane syndrome, Gastro-oesophageal reflux disease, " "OCD (obsessive compulsive disorder), PTSD (post-traumatic stress disorder), and Strabismus.  Patient Active Problem List   Diagnosis     Duane's syndrome     Microcytosis     Left knee pain, unspecified chronicity       Surgical Hx:  has a past surgical history that includes strabismus surgery (6/27/13); Nissen fundoplication (1/2010); Septoplasty (12/2011); and Recession resection (repair strabismus) bilateral (Bilateral, 1/20/2015).    Medications:   Current Outpatient Medications:      Ferrous Sulfate (IRON PO), Take 324 mg by mouth, Disp: , Rfl:     Allergies:   Allergies   Allergen Reactions     Dust Mites      Ipratropium Other (See Comments)     raw nose      crusted     bleeding     led to sinus infection      2/2014        Social Hx: assembly line work.   reports that he has never smoked. He has never used smokeless tobacco. He reports that he does not drink alcohol or use drugs.    Family Hx: family history includes Multiple Sclerosis in his maternal grandfather and maternal uncle; Neurologic Disorder in his mother; Strabismus in his maternal grandmother and another family member.    REVIEW OF SYSTEMS: 10 point ROS neg other than the symptoms noted above in the HPI and PMH. Notables include  CONSTITUTIONAL:NEGATIVE for fever, chills, change in weight  INTEGUMENTARY/SKIN: NEGATIVE for worrisome rashes, moles or lesions  MUSCULOSKELETAL:See HPI above  NEURO: NEGATIVE for weakness, dizziness or paresthesias    PHYSICAL EXAM:  /86   Pulse 77   Ht 1.803 m (5' 11\")   Wt 109.8 kg (242 lb)   BMI 33.75 kg/m     GENERAL APPEARANCE: healthy, alert, no distress  SKIN: no suspicious lesions or rashes  NEURO: Normal strength and tone, mentation intact and speech normal  PSYCH:  mentation appears normal and affect normal, not anxious  RESPIRATORY: No increased work of breathing.  HANDS: no clubbing, nail pitting  LYMPH: no palpable popliteal lymphadenopathy.    BILATERAL LOWER EXTREMITIES:  Gait: normal  No " gross deformities or masses.  No Quad atrophy, strength normal.  Intact sensation deep peroneal nerve, superficial peroneal nerve, med/lat tibial nerve, sural nerve, saphenous nerve  Intact EHL, EDL, TA, FHL, GS, quadriceps hamstrings and hip flexors  Toes warm and well perfused, brisk capillary refill. Palpable 2+ dp pulses.  Bilateral calf soft and nttp or squeeze.  DTRs: achilles 2+, patella 2+.  Edema: none    LEFT KNEE EXAM:    Skin: intact, no ecchymosis or erythema  Squat: 100 %, not limited by pain.   causes anterior pain.  ROM: full extension to 135+ flexion  Tight hamstrings on straight leg raise.  Effusion: none  Tender: anteromedial and anterolateral knee along the patella facets and patella tendon  NTTP med/lat joint line, posterior knee  McMurrays: negative    MCL: stable, and non-painful at both 0 and 30 degrees knee flexion  Varus stress: stable, and non-painful at both 0 and 30 degrees knee flexion  Lachmans: neg, firm endpoint  Posterior Drawer stable  Patellofemoral joint:                Apprehension: negative              Crepitations: mild   Grind: positive.    RIGHT KNEE EXAM:    Skin: intact, no ecchymosis or erythema  Squat: 100 %, not limited by pain.     ROM: full extension to 135+ flexion  Tight hamstrings on straight leg raise.  Effusion: none  Tender: NTTP med/lat joint line, anterior or posterior knee  McMurrays: negative    MCL: stable, and non-painful at both 0 and 30 degrees knee flexion  Varus stress: stable, and non-painful at both 0 and 30 degrees knee flexion  Lachmans: neg, firm endpoint  Posterior Drawer stable  Patellofemoral joint:                Apprehension: negative              Crepitations: minimal    X-RAY:  2 views left knee from 3/3/2021, sunrise view 3/10/2021 were reviewed in clinic today. On my review, no obvious fractures or dislocations.    MRI:  MRI left knee from 4/28/2021 was reviewed in clinic today.    1. Free edge blunting of the lateral meniscus without a  discrete tear  within either the medial or lateral meniscus.  2. The cruciate ligaments as well as the medial and lateral supporting  structures are intact.           ASSESSMENT/PLAN: Savage Roberts is a 26 year old male with chronic anterior knee pain, patello-femoral pain     * reviewed imaging studies with patient  * dont suspect meniscal pathology based on images, exam, history.  * appears to be patello-femoral in nature  * resume previous cares, no surgical indication at this time  * discussed a one-time trial of cortisone, patient elects to proceed. See procedure note below  * return to clinic as needed. Recommend f/up with Dr Cowan as no obvious surgical indication.    Treatment:    * rest, sitting  * Activity modification - avoid impact activities or activities that aggravate symptoms. Avoid repetitive activities.  * NSAIDS (non-steroidal anti-inflammatory medications; e.g. Aleve, advil, motrin, ibuprofen) - regular use for inflammation ( twice daily or three times daily), with food, as long as no contra-indications Please discuss with primary care doctor if needed  * ice, 15-20 minutes at a time several times a day or as needed.  * Strengthening of quadriceps muscles  * Physical Therapy for strengthening, stretching and range of motion exercises of legs  * Tylenol as needed for pain, consider Tylenol arthritis or similar  * Weight loss: Weight loss:  Body mass index is 33.75 kg/m .. weight loss benefits, not only for the current pain symptoms, but also overall health. Recommend a good diet plan that works for the patient, with the assistance of a dietician or primary care doctor as needed. Also, a good, low-impact exercise program for at least 20 minutes per day, 3 times per week, such as exercise bike, elliptical , or pool.  * Exercise: low impact such as stationary bike, elliptical, pool.  * Injections: cortisone; risks and perceived benefits discussed today. Patient elects to proceed.  *  Bracing: bracing the knee may offer some relief of symptoms when worn and provide some stability.  * over the counter supplements such as glucosamine and chondroitin sulfate may help with joint pain.  * topical ointments may help as well    * return to clinic as needed.      Large Joint Injection/Arthocentesis: L knee joint    Date/Time: 5/12/2021 3:24 PM  Performed by: Pratik Foster MD  Authorized by: Pratik Foster MD     Indications:  Pain  Needle Size:  22 G  Guidance: landmark guided    Approach:  Anterolateral  Location:  Knee      Medications:  80 mg methylPREDNISolone 80 MG/ML; 4 mL bupivacaine 0.25 %  Outcome:  Tolerated well, no immediate complications  Procedure discussed: discussed risks, benefits, and alternatives    Consent Given by:  Patient  Timeout: timeout called immediately prior to procedure    Prep: patient was prepped and draped in usual sterile fashion            Pratik Foster M.D., M.S.  Dept. of Orthopaedic Surgery  [facility]

## 2021-05-12 NOTE — LETTER
"    5/12/2021         RE: Savage Roberts  [address] [place] [address]        Dear Colleague,    Thank you for referring your patient, Savage Roberts, to the [facility] [place]. Please see a copy of my visit note below.    CHIEF COMPLAINT:   Chief Complaint   Patient presents with     Left Knee - Pain     Chronic Left Knee Pain - XR 3/10/21 and MR 4/28/21 Pain began approximately 6 months ago without injury. Pain is described as deep within the knee and is worse with prolonged standing. He has tried PHYSICAL THERAPY with minimal relief.      Savage Roberts is seen today in the [facility] for evaluation of left knee pain at the request of Dr. Mariajose Cowan         HISTORY OF PRESENT ILLNESS    Savage Roberts is a 26 year old male seen for evaluation of ongoing left knee pain with no known injury.   Pain has been present for 6 months. Locates pain \"in the knee\", points anterolateral, worse with weight bearing activities and prolonged standing, but has pain at rest, occasional at night. Some new pain, a little, in the back as well.    Treatment with Physical Therapy with minimal relief. Worn patella brace and taping which helped for the first week or so, but after a week with work started to bother again. Takes ibuprofen.    Had knee pain in the past with rapid growth and told due to growing.    Occasional numbness and tingling left lower extremity ankle to toes, as well as left upper extremity.    Present symptoms: pain anteriorly, pain sharp, shooting, dull/achy , mild pain.    Pain severity: 5/10  Frequency of symptoms: are constant  Exacerbating Factors: weight bearing, stairs, [id number], prolonged standing  Relieving Factors: rest, sitting, positional changes  Night Pain: Yes  Pain while at rest: Yes   Numbness or tingling: No   Patient has tried:     NSAIDS: Yes      Physical Therapy: Yes      Activity modification: " "Yes      Bracing: Yes      Injections: No      Ice: Yes      Assistive device:  No     Other: taping      Other PMH:  has a past medical history of Anxiety disorder, Autistic disorder, Bipolar affective disorder (H), Duane syndrome, Gastro-oesophageal reflux disease, OCD (obsessive compulsive disorder), PTSD (post-traumatic stress disorder), and Strabismus.  Patient Active Problem List   Diagnosis     Duane's syndrome     Microcytosis     Left knee pain, unspecified chronicity       Surgical Hx:  has a past surgical history that includes strabismus surgery (6/27/13); Nissen fundoplication (1/2010); Septoplasty (12/2011); and Recession resection (repair strabismus) bilateral (Bilateral, 1/20/2015).    Medications:   Current Outpatient Medications:      Ferrous Sulfate (IRON PO), Take 324 mg by mouth, Disp: , Rfl:     Allergies:   Allergies   Allergen Reactions     Dust Mites      Ipratropium Other (See Comments)     raw nose      crusted     bleeding     led to sinus infection      2/2014        Social Hx: assembly line work.   reports that he has never smoked. He has never used smokeless tobacco. He reports that he does not drink alcohol or use drugs.    Family Hx: family history includes Multiple Sclerosis in his maternal grandfather and maternal uncle; Neurologic Disorder in his mother; Strabismus in his maternal grandmother and another family member.    REVIEW OF SYSTEMS: 10 point ROS neg other than the symptoms noted above in the HPI and PMH. Notables include  CONSTITUTIONAL:NEGATIVE for fever, chills, change in weight  INTEGUMENTARY/SKIN: NEGATIVE for worrisome rashes, moles or lesions  MUSCULOSKELETAL:See HPI above  NEURO: NEGATIVE for weakness, dizziness or paresthesias    PHYSICAL EXAM:  /86   Pulse 77   Ht 1.803 m (5' 11\")   Wt 109.8 kg (242 lb)   BMI 33.75 kg/m     GENERAL APPEARANCE: healthy, alert, no distress  SKIN: no suspicious lesions or rashes  NEURO: Normal strength and tone, mentation " intact and speech normal  PSYCH:  mentation appears normal and affect normal, not anxious  RESPIRATORY: No increased work of breathing.  HANDS: no clubbing, nail pitting  LYMPH: no palpable popliteal lymphadenopathy.    BILATERAL LOWER EXTREMITIES:  Gait: normal  No gross deformities or masses.  No Quad atrophy, strength normal.  Intact sensation deep peroneal nerve, superficial peroneal nerve, med/lat tibial nerve, sural nerve, saphenous nerve  Intact EHL, EDL, TA, FHL, GS, quadriceps hamstrings and hip flexors  Toes warm and well perfused, brisk capillary refill. Palpable 2+ dp pulses.  Bilateral calf soft and nttp or squeeze.  DTRs: achilles 2+, patella 2+.  Edema: none    LEFT KNEE EXAM:    Skin: intact, no ecchymosis or erythema  Squat: 100 %, not limited by pain.   causes anterior pain.  ROM: full extension to 135+ flexion  Tight hamstrings on straight leg raise.  Effusion: none  Tender: anteromedial and anterolateral knee along the patella facets and patella tendon  NTTP med/lat joint line, posterior knee  McMurrays: negative    MCL: stable, and non-painful at both 0 and 30 degrees knee flexion  Varus stress: stable, and non-painful at both 0 and 30 degrees knee flexion  Lachmans: neg, firm endpoint  Posterior Drawer stable  Patellofemoral joint:                Apprehension: negative              Crepitations: mild   Grind: positive.    RIGHT KNEE EXAM:    Skin: intact, no ecchymosis or erythema  Squat: 100 %, not limited by pain.     ROM: full extension to 135+ flexion  Tight hamstrings on straight leg raise.  Effusion: none  Tender: NTTP med/lat joint line, anterior or posterior knee  McMurrays: negative    MCL: stable, and non-painful at both 0 and 30 degrees knee flexion  Varus stress: stable, and non-painful at both 0 and 30 degrees knee flexion  Lachmans: neg, firm endpoint  Posterior Drawer stable  Patellofemoral joint:                Apprehension: negative              Crepitations: minimal    X-RAY:   2 views left knee from 3/3/2021, sunrise view 3/10/2021 were reviewed in clinic today. On my review, no obvious fractures or dislocations.    MRI:  MRI left knee from 4/28/2021 was reviewed in clinic today.    1. Free edge blunting of the lateral meniscus without a discrete tear  within either the medial or lateral meniscus.  2. The cruciate ligaments as well as the medial and lateral supporting  structures are intact.           ASSESSMENT/PLAN: Savage Roberts is a 26 year old male with chronic anterior knee pain, patello-femoral pain     * reviewed imaging studies with patient  * dont suspect meniscal pathology based on images, exam, history.  * appears to be patello-femoral in nature  * resume previous cares, no surgical indication at this time  * discussed a one-time trial of cortisone, patient elects to proceed. See procedure note below  * return to clinic as needed. Recommend f/up with Dr Cowan as no obvious surgical indication.    Treatment:    * rest, sitting  * Activity modification - avoid impact activities or activities that aggravate symptoms. Avoid repetitive activities.  * NSAIDS (non-steroidal anti-inflammatory medications; e.g. Aleve, advil, motrin, ibuprofen) - regular use for inflammation ( twice daily or three times daily), with food, as long as no contra-indications Please discuss with primary care doctor if needed  * ice, 15-20 minutes at a time several times a day or as needed.  * Strengthening of quadriceps muscles  * Physical Therapy for strengthening, stretching and range of motion exercises of legs  * Tylenol as needed for pain, consider Tylenol arthritis or similar  * Weight loss: Weight loss:  Body mass index is 33.75 kg/m .. weight loss benefits, not only for the current pain symptoms, but also overall health. Recommend a good diet plan that works for the patient, with the assistance of a dietician or primary care doctor as needed. Also, a good, low-impact exercise program for  at least 20 minutes per day, 3 times per week, such as exercise bike, elliptical , or pool.  * Exercise: low impact such as stationary bike, elliptical, pool.  * Injections: cortisone; risks and perceived benefits discussed today. Patient elects to proceed.  * Bracing: bracing the knee may offer some relief of symptoms when worn and provide some stability.  * over the counter supplements such as glucosamine and chondroitin sulfate may help with joint pain.  * topical ointments may help as well    * return to clinic as needed.      Large Joint Injection/Arthocentesis: L knee joint    Date/Time: 5/12/2021 3:24 PM  Performed by: Pratik Foster MD  Authorized by: Pratik Foster MD     Indications:  Pain  Needle Size:  22 G  Guidance: landmark guided    Approach:  Anterolateral  Location:  Knee      Medications:  80 mg methylPREDNISolone 80 MG/ML; 4 mL bupivacaine 0.25 %  Outcome:  Tolerated well, no immediate complications  Procedure discussed: discussed risks, benefits, and alternatives    Consent Given by:  Patient  Timeout: timeout called immediately prior to procedure    Prep: patient was prepped and draped in usual sterile fashion            Pratik Foster M.D., M.S.  Dept. of Orthopaedic Surgery  [facility]          Again, thank you for allowing me to participate in the care of your patient.        Sincerely,        Pratik Foster MD

## 2021-09-19 ENCOUNTER — HEALTH MAINTENANCE LETTER (OUTPATIENT)
Age: 26
End: 2021-09-19

## 2022-04-22 ENCOUNTER — OFFICE VISIT (OUTPATIENT)
Dept: FAMILY MEDICINE | Facility: CLINIC | Age: 27
End: 2022-04-22
Payer: COMMERCIAL

## 2022-04-22 VITALS
WEIGHT: 250 LBS | HEART RATE: 72 BPM | BODY MASS INDEX: 35 KG/M2 | DIASTOLIC BLOOD PRESSURE: 70 MMHG | HEIGHT: 71 IN | RESPIRATION RATE: 16 BRPM | SYSTOLIC BLOOD PRESSURE: 122 MMHG | TEMPERATURE: 97.3 F

## 2022-04-22 DIAGNOSIS — M25.50 MULTIPLE JOINT PAIN: ICD-10-CM

## 2022-04-22 DIAGNOSIS — Z00.00 ENCOUNTER FOR PREVENTATIVE ADULT HEALTH CARE EXAMINATION: Primary | ICD-10-CM

## 2022-04-22 DIAGNOSIS — G47.39 OTHER SLEEP APNEA: ICD-10-CM

## 2022-04-22 DIAGNOSIS — R04.0 EPISTAXIS: ICD-10-CM

## 2022-04-22 LAB
CRP SERPL-MCNC: <2.9 MG/L (ref 0–8)
ERYTHROCYTE [DISTWIDTH] IN BLOOD BY AUTOMATED COUNT: 12.8 % (ref 10–15)
ERYTHROCYTE [SEDIMENTATION RATE] IN BLOOD BY WESTERGREN METHOD: 7 MM/HR (ref 0–15)
HCT VFR BLD AUTO: 43.7 % (ref 40–53)
HGB BLD-MCNC: 14.9 G/DL (ref 13.3–17.7)
MCH RBC QN AUTO: 27 PG (ref 26.5–33)
MCHC RBC AUTO-ENTMCNC: 34.1 G/DL (ref 31.5–36.5)
MCV RBC AUTO: 79 FL (ref 78–100)
PLATELET # BLD AUTO: 233 10E3/UL (ref 150–450)
RBC # BLD AUTO: 5.51 10E6/UL (ref 4.4–5.9)
URATE SERPL-MCNC: 7.4 MG/DL (ref 3.5–7.2)
WBC # BLD AUTO: 5.3 10E3/UL (ref 4–11)

## 2022-04-22 PROCEDURE — 36415 COLL VENOUS BLD VENIPUNCTURE: CPT | Performed by: NURSE PRACTITIONER

## 2022-04-22 PROCEDURE — 86140 C-REACTIVE PROTEIN: CPT | Performed by: NURSE PRACTITIONER

## 2022-04-22 PROCEDURE — 85652 RBC SED RATE AUTOMATED: CPT | Performed by: NURSE PRACTITIONER

## 2022-04-22 PROCEDURE — 99213 OFFICE O/P EST LOW 20 MIN: CPT | Mod: 25 | Performed by: NURSE PRACTITIONER

## 2022-04-22 PROCEDURE — 99395 PREV VISIT EST AGE 18-39: CPT | Performed by: NURSE PRACTITIONER

## 2022-04-22 PROCEDURE — 85027 COMPLETE CBC AUTOMATED: CPT | Performed by: NURSE PRACTITIONER

## 2022-04-22 PROCEDURE — 86618 LYME DISEASE ANTIBODY: CPT | Performed by: NURSE PRACTITIONER

## 2022-04-22 PROCEDURE — 84550 ASSAY OF BLOOD/URIC ACID: CPT | Performed by: NURSE PRACTITIONER

## 2022-04-22 PROCEDURE — 86431 RHEUMATOID FACTOR QUANT: CPT | Performed by: NURSE PRACTITIONER

## 2022-04-22 PROCEDURE — 86038 ANTINUCLEAR ANTIBODIES: CPT | Performed by: NURSE PRACTITIONER

## 2022-04-22 ASSESSMENT — ENCOUNTER SYMPTOMS
ARTHRALGIAS: 1
DIZZINESS: 1
ABDOMINAL PAIN: 1
MYALGIAS: 1
WEAKNESS: 1
HEADACHES: 1

## 2022-04-22 ASSESSMENT — ACTIVITIES OF DAILY LIVING (ADL): CURRENT_FUNCTION: NO ASSISTANCE NEEDED

## 2022-04-22 NOTE — PATIENT INSTRUCTIONS
Patient Education   Personalized Prevention Plan  You are due for the preventive services outlined below.  Your care team is available to assist you in scheduling these services.  If you have already completed any of these items, please share that information with your care team to update in your medical record.  Health Maintenance Due   Topic Date Due     ANNUAL REVIEW OF HM ORDERS  Never done     Depression Action Plan  Never done     HIV Screening  Never done     Annual Wellness Visit  Never done     Hepatitis C Screening  Never done     Flu Vaccine (1) 09/01/2021     COVID-19 Vaccine (2 - Booster for Malcom series) 09/13/2021     Your Health Risk Assessment indicates you feel you are not in good health    A healthy lifestyle helps keep the body fit and the mind alert. It helps protect you from disease, helps you fight disease, and helps prevent chronic disease (disease that doesn't go away) from getting worse. This is important as you get older and begin to notice twinges in muscles and joints and a decline in the strength and stamina you once took for granted. A healthy lifestyle includes good healthcare, good nutrition, weight control, recreation, and regular exercise. Avoid harmful substances and do what you can to keep safe. Another part of a healthy lifestyle is stay mentally active and socially involved.    Good healthcare     Have a wellness visit every year.     If you have new symptoms, let us know right away. Don't wait until the next checkup.     Take medicines exactly as prescribed and keep your medicines in a safe place. Tell us if your medicine causes problems.   Healthy diet and weight control     Eat 3 or 4 small, nutritious, low-fat, high-fiber meals a day. Include a variety of fruits, vegetables, and whole-grain foods.     Make sure you get enough calcium in your diet. Calcium, vitamin D, and exercise help prevent osteoporosis (bone thinning).     If you live alone, try eating with others  when you can. That way you get a good meal and have company while you eat it.     Try to keep a healthy weight. If you eat more calories than your body uses for energy, it will be stored as fat and you will gain weight.     Recreation   Recreation is not limited to sports and team events. It includes any activity that provides relaxation, interest, enjoyment, and exercise. Recreation provides an outlet for physical, mental, and social energy. It can give a sense of worth and achievement. It can help you stay healthy.    Mental Exercise and Social Involvement  Mental and emotional health is as important as physical health. Keep in touch with friends and family. Stay as active as possible. Continue to learn and challenge yourself.   Things you can do to stay mentally active are:    Learn something new, like a foreign language or musical instrument.     Play SCRABBLE or do crossword puzzles. If you cannot find people to play these games with you at home, you can play them with others on your computer through the Internet.     Join a games club--anything from card games to chess or checkers or lawn bowling.     Start a new hobby.     Go back to school.     Volunteer.     Read.   Keep up with world events.    Understanding USDA MyPlate  The USDA has guidelines to help you make healthy food choices. These are called MyPlate. MyPlate shows the food groups that make up healthy meals using the image of a place setting. Before you eat, think about the healthiest choices for what to put on your plate or in your cup or bowl. To learn more about building a healthy plate, visit www.choosemyplate.gov.    The food groups    Fruits. Any fruit or 100% fruit juice counts as part of the Fruit Group. Fruits may be fresh, canned, frozen, or dried, and may be whole, cut-up, or pureed. Make 1/2 of your plate fruits and vegetables.    Vegetables. Any vegetable or 100% vegetable juice counts as a member of the Vegetable Group. Vegetables may  be fresh, frozen, canned, or dried. They can be served raw or cooked and may be whole, cut-up, or mashed. Make 1/2 of your plate fruits and vegetables.    Grains. All foods made from grains are part of the Grains Group. These include wheat, rice, oats, cornmeal, and barley. Grains are often used to make foods such as bread, pasta, oatmeal, cereal, tortillas, and grits. Grains should be no more than 1/4 of your plate. At least half of your grains should be whole grains.    Protein. This group includes meat, poultry, seafood, beans and peas, eggs, processed soy products (such as tofu), nuts (including nut butters), and seeds. Make protein choices no more than 1/4 of your plate. Meat and poultry choices should be lean or low fat.    Dairy. The Dairy Group includes all fluid milk products and foods made from milk that contain calcium, such as yogurt and cheese. (Foods that have little calcium, such as cream, butter, and cream cheese, are not part of this group.) Most dairy choices should be low-fat or fat-free.    Oils. Oils aren't a food group, but they do contain essential nutrients. However it's important to watch your intake of oils. These are fats that are liquid at room temperature. They include canola, corn, olive, soybean, vegetable, and sunflower oil. Foods that are mainly oil include mayonnaise, certain salad dressings, and soft margarines. You likely already get your daily oil allowance from the foods you eat.  Things to limit  Eating healthy also means limiting these things in your diet:       Salt (sodium). Many processed foods have a lot of sodium. To keep sodium intake down, eat fresh vegetables, meats, poultry, and seafood when possible. Purchase low-sodium, reduced-sodium, or no-salt-added food products at the store. And don't add salt to your meals at home. Instead, season them with herbs and spices such as dill, oregano, cumin, and paprika. Or try adding flavor with lemon or lime zest and  juice.    Saturated fat. Saturated fats are most often found in animal products such as beef, pork, and chicken. They are often solid at room temperature, such as butter. To reduce your saturated fat intake, choose leaner cuts of meat and poultry. And try healthier cooking methods such as grilling, broiling, roasting, or baking. For a simple lower-fat swap, use plain nonfat yogurt instead of mayonnaise when making potato salad or macaroni salad.    Added sugars. These are sugars added to foods. They are in foods such as ice cream, candy, soda, fruit drinks, sports drinks, energy drinks, cookies, pastries, jams, and syrups. Cut down on added sugars by sharing sweet treats with a family member or friend. You can also choose fruit for dessert, and drink water or other unsweetened beverages.     [employer] last reviewed this educational content on 6/1/2020 2000-2021 [employer]. All rights reserved. This information is not intended as a substitute for professional medical care. Always follow your healthcare professional's instructions.          Signs of Hearing Loss      Hearing much better with one ear can be a sign of hearing loss.   Hearing loss is a problem shared by many people. In fact, it is one of the most common health problems, particularly as people age. Most people age 65 and older have some hearing loss. By age 80, almost everyone does. Hearing loss often occurs slowly over the years. So you may not realize your hearing has gotten worse.  Have your hearing checked  Call your healthcare provider if you:    Have to strain to hear normal conversation    Have to watch other people s faces very carefully to follow what they re saying    Need to ask people to repeat what they ve said    Often misunderstand what people are saying    Turn the volume of the television or radio up so high that others complain    Feel that people are mumbling when they re talking to you    Find that the effort to hear  leaves you feeling tired and irritated    Notice, when using the phone, that you hear better with one ear than the other  [employer] last reviewed this educational content on 1/1/2020 2000-2021 [employer]. All rights reserved. This information is not intended as a substitute for professional medical care. Always follow your healthcare professional's instructions.

## 2022-04-22 NOTE — PROGRESS NOTES
"SUBJECTIVE:   CC: Savage Roberts is an 27 year old male who presents for preventative health visit.     Patient has been advised of split billing requirements and indicates understanding: Yes     Healthy Habits:     In general, how would you rate your overall health?  Fair    Frequency of exercise:  2-3 days/week    Duration of exercise:  15-30 minutes    Do you usually eat at least 4 servings of fruit and vegetables a day, include whole grains    & fiber and avoid regularly eating high fat or \"junk\" foods?  No    Taking medications regularly:  Not Applicable    Medication side effects:  Not applicable    Ability to successfully perform activities of daily living:  No assistance needed    Home Safety:  No safety concerns identified    Hearing Impairment:  Difficulty following a conversation in a noisy restaurant or crowded room    In the past 6 months, have you been bothered by leaking of urine?  No    In general, how would you rate your overall mental or emotional health?  Excellent      PHQ-2 Total Score: 0    Additional concerns today:  Yes    Sleep Problem - patient states he was diagnosed with sleep apnea when younger. Would like referral for this.   Was kicked out of his parents at 17 YO and did not get his CPAP machine     Joint Pain - intermittent, multiple joints. States changes of weather makes this worse. Usually in left knee, hip and ankle, right hip  Numbness and tingling into foot when pain is present-lasting an hour to 3-4 days  Standing makes it worse, walking is better    Today's PHQ-2 Score:   PHQ-2 ( 1999 Pfizer) 4/22/2022   Q1: Little interest or pleasure in doing things 0   Q2: Feeling down, depressed or hopeless 0   PHQ-2 Score 0   PHQ-2 Total Score (12-17 Years)- Positive if 3 or more points; Administer PHQ-A if positive -   Q1: Little interest or pleasure in doing things Not at all   Q2: Feeling down, depressed or hopeless Not at all   PHQ-2 Score 0       Abuse: Current or " "Past(Physical, Sexual or Emotional)- No  Do you feel safe in your environment? Yes    Have you ever done Advance Care Planning? (For example, a Health Directive, POLST, or a discussion with a medical provider or your loved ones about your wishes): No, advance care planning information given to patient to review.  Advanced care planning was discussed at today's visit.    Social History     Tobacco Use     Smoking status: Never Smoker     Smokeless tobacco: Never Used   Substance Use Topics     Alcohol use: No         Alcohol Use 4/22/2022   Prescreen: >3 drinks/day or >7 drinks/week? No       Last PSA: No results found for: PSA    Reviewed orders with patient. Reviewed health maintenance and updated orders accordingly - Yes  Labs reviewed in EPIC    Reviewed and updated as needed this visit by clinical staff   Tobacco  Allergies  Meds  Problems  Med Hx  Surg Hx  Fam Hx  Soc   Hx          Reviewed and updated as needed this visit by Provider   Tobacco  Allergies  Meds  Problems  Med Hx  Surg Hx  Fam Hx               Review of Systems   Gastrointestinal: Positive for abdominal pain.   Genitourinary: Negative for impotence and penile discharge.   Musculoskeletal: Positive for arthralgias and myalgias.   Neurological: Positive for dizziness, weakness and headaches.     OBJECTIVE:   /70 (Cuff Size: Adult Regular)   Pulse 72   Temp 97.3  F (36.3  C) (Tympanic)   Resp 16   Ht 1.797 m (5' 10.75\")   Wt 113.4 kg (250 lb)   BMI 35.11 kg/m      Physical Exam  GENERAL: healthy, alert and no distress  EYES: Eyes grossly normal to inspection, PERRL and conjunctivae and sclerae normal  HENT: ear canals and TM's normal, nose and mouth without ulcers or lesions  NECK: no adenopathy, no asymmetry, masses, or scars and thyroid normal to palpation  RESP: lungs clear to auscultation - no rales, rhonchi or wheezes  CV: regular rate and rhythm, normal S1 S2, no S3 or S4, no murmur, click or rub, no peripheral edema " and peripheral pulses strong  ABDOMEN: soft, nontender, no hepatosplenomegaly, no masses and bowel sounds normal  MS: no gross musculoskeletal defects noted, no edema  SKIN: no suspicious lesions or rashes  NEURO: Normal strength and tone, mentation intact and speech normal  PSYCH: mentation appears normal, affect normal/bright    Diagnostic Test Results:  Results for orders placed or performed in visit on 04/22/22   CBC with platelets     Status: Normal   Result Value Ref Range    WBC Count 5.3 4.0 - 11.0 10e3/uL    RBC Count 5.51 4.40 - 5.90 10e6/uL    Hemoglobin 14.9 13.3 - 17.7 g/dL    Hematocrit 43.7 40.0 - 53.0 %    MCV 79 78 - 100 fL    MCH 27.0 26.5 - 33.0 pg    MCHC 34.1 31.5 - 36.5 g/dL    RDW 12.8 10.0 - 15.0 %    Platelet Count 233 150 - 450 10e3/uL   ESR: Erythrocyte sedimentation rate     Status: Normal   Result Value Ref Range    Erythrocyte Sedimentation Rate 7 0 - 15 mm/hr       ASSESSMENT/PLAN:   (Z00.00) Encounter for preventative adult health care examination  (primary encounter diagnosis)    (M25.50) Multiple joint pain  Comment: no acute distress. With multiple joint pain with intermittent flares, labs ordered for further evaluation.  Recommend ortho evaluation pending labs.   Plan: CRP, inflammation, ESR: Erythrocyte         sedimentation rate, Anti Nuclear Britany IgG by IFA        with Reflex, Rheumatoid factor, Uric acid, Lyme        Disease Britany with reflex to WB Serum          (R04.0) Epistaxis  Comment: labs normal. Symptomatic care and follow up discussed.  Should see ENT if not improving with interventions.   Plan: CBC with platelets          (G47.39) Other sleep apnea  Comment: sleep medicine referral placed with history of untreated sleep apnea.  Plan: Adult Sleep Eval & Management          Referral         COUNSELING:   Reviewed preventive health counseling, as reflected in patient instructions    Estimated body mass index is 35.11 kg/m  as calculated from the following:     "Height as of this encounter: 1.797 m (5' 10.75\").    Weight as of this encounter: 113.4 kg (250 lb).      He reports that he has never smoked. He has never used smokeless tobacco.      Counseling Resources:  ATP IV Guidelines  Pooled Cohorts Equation Calculator  FRAX Risk Assessment  ICSI Preventive Guidelines  Dietary Guidelines for Americans, 2010  USDA's MyPlate  ASA Prophylaxis  Lung CA Screening    AUDELIA Xiong CNP  [facility]  "

## 2022-04-25 LAB
ANA SER QL IF: NEGATIVE
B BURGDOR IGG+IGM SER QL: 0.7
RHEUMATOID FACT SER NEPH-ACNC: <6 IU/ML

## 2022-04-26 DIAGNOSIS — M10.9 GOUT OF MULTIPLE SITES, UNSPECIFIED CAUSE, UNSPECIFIED CHRONICITY: Primary | ICD-10-CM

## 2022-04-26 RX ORDER — ALLOPURINOL 100 MG/1
100 TABLET ORAL DAILY
Qty: 90 TABLET | Refills: 3 | Status: SHIPPED | OUTPATIENT
Start: 2022-04-26 | End: 2023-06-19

## 2022-06-09 ENCOUNTER — HOSPITAL ENCOUNTER (EMERGENCY)
Facility: CLINIC | Age: 27
Discharge: HOME | End: 2022-06-09
Attending: FAMILY MEDICINE | Admitting: FAMILY MEDICINE
Payer: COMMERCIAL

## 2022-06-09 ENCOUNTER — NURSE TRIAGE (OUTPATIENT)
Dept: NURSING | Facility: CLINIC | Age: 27
End: 2022-06-09
Payer: COMMERCIAL

## 2022-06-09 ENCOUNTER — APPOINTMENT (OUTPATIENT)
Dept: GENERAL RADIOLOGY | Facility: CLINIC | Age: 27
End: 2022-06-09
Attending: FAMILY MEDICINE
Payer: COMMERCIAL

## 2022-06-09 VITALS
OXYGEN SATURATION: 95 % | WEIGHT: 240 LBS | DIASTOLIC BLOOD PRESSURE: 82 MMHG | HEIGHT: 71 IN | HEART RATE: 65 BPM | SYSTOLIC BLOOD PRESSURE: 137 MMHG | BODY MASS INDEX: 33.6 KG/M2 | TEMPERATURE: 96.8 F | RESPIRATION RATE: 21 BRPM

## 2022-06-09 DIAGNOSIS — R07.89 ATYPICAL CHEST PAIN: ICD-10-CM

## 2022-06-09 DIAGNOSIS — I49.3 PVC'S (PREMATURE VENTRICULAR CONTRACTIONS): ICD-10-CM

## 2022-06-09 LAB
ALBUMIN SERPL-MCNC: 4 G/DL (ref 3.4–5)
ALP SERPL-CCNC: 75 U/L (ref 40–150)
ALT SERPL W P-5'-P-CCNC: 57 U/L (ref 0–70)
ANION GAP SERPL CALCULATED.3IONS-SCNC: 4 MMOL/L (ref 3–14)
AST SERPL W P-5'-P-CCNC: 24 U/L (ref 0–45)
BASOPHILS # BLD AUTO: 0 10E3/UL (ref 0–0.2)
BASOPHILS NFR BLD AUTO: 0 %
BILIRUB SERPL-MCNC: 0.4 MG/DL (ref 0.2–1.3)
BUN SERPL-MCNC: 16 MG/DL (ref 7–30)
CALCIUM SERPL-MCNC: 9.3 MG/DL (ref 8.5–10.1)
CHLORIDE BLD-SCNC: 110 MMOL/L (ref 94–109)
CO2 SERPL-SCNC: 27 MMOL/L (ref 20–32)
CREAT SERPL-MCNC: 0.97 MG/DL (ref 0.66–1.25)
CRP SERPL-MCNC: 3 MG/L (ref 0–8)
EOSINOPHIL # BLD AUTO: 0 10E3/UL (ref 0–0.7)
EOSINOPHIL NFR BLD AUTO: 0 %
ERYTHROCYTE [DISTWIDTH] IN BLOOD BY AUTOMATED COUNT: 12.7 % (ref 10–15)
GFR SERPL CREATININE-BSD FRML MDRD: >90 ML/MIN/1.73M2
GLUCOSE BLD-MCNC: 103 MG/DL (ref 70–99)
HCT VFR BLD AUTO: 45.7 % (ref 40–53)
HGB BLD-MCNC: 15.2 G/DL (ref 13.3–17.7)
HOLD SPECIMEN: NORMAL
IMM GRANULOCYTES # BLD: 0 10E3/UL
IMM GRANULOCYTES NFR BLD: 0 %
LIPASE SERPL-CCNC: 118 U/L (ref 73–393)
LYMPHOCYTES # BLD AUTO: 0.9 10E3/UL (ref 0.8–5.3)
LYMPHOCYTES NFR BLD AUTO: 13 %
MCH RBC QN AUTO: 26.3 PG (ref 26.5–33)
MCHC RBC AUTO-ENTMCNC: 33.3 G/DL (ref 31.5–36.5)
MCV RBC AUTO: 79 FL (ref 78–100)
MONOCYTES # BLD AUTO: 0.2 10E3/UL (ref 0–1.3)
MONOCYTES NFR BLD AUTO: 3 %
NEUTROPHILS # BLD AUTO: 5.5 10E3/UL (ref 1.6–8.3)
NEUTROPHILS NFR BLD AUTO: 84 %
NRBC # BLD AUTO: 0 10E3/UL
NRBC BLD AUTO-RTO: 0 /100
PLATELET # BLD AUTO: 252 10E3/UL (ref 150–450)
POTASSIUM BLD-SCNC: 4.7 MMOL/L (ref 3.4–5.3)
PROT SERPL-MCNC: 8.2 G/DL (ref 6.8–8.8)
RBC # BLD AUTO: 5.78 10E6/UL (ref 4.4–5.9)
SODIUM SERPL-SCNC: 141 MMOL/L (ref 133–144)
TROPONIN I SERPL HS-MCNC: 4 NG/L
WBC # BLD AUTO: 6.6 10E3/UL (ref 4–11)

## 2022-06-09 PROCEDURE — 99284 EMERGENCY DEPT VISIT MOD MDM: CPT | Mod: 25 | Performed by: FAMILY MEDICINE

## 2022-06-09 PROCEDURE — 71046 X-RAY EXAM CHEST 2 VIEWS: CPT

## 2022-06-09 PROCEDURE — 80053 COMPREHEN METABOLIC PANEL: CPT | Performed by: FAMILY MEDICINE

## 2022-06-09 PROCEDURE — 93005 ELECTROCARDIOGRAM TRACING: CPT

## 2022-06-09 PROCEDURE — 86140 C-REACTIVE PROTEIN: CPT | Performed by: FAMILY MEDICINE

## 2022-06-09 PROCEDURE — 36415 COLL VENOUS BLD VENIPUNCTURE: CPT | Performed by: FAMILY MEDICINE

## 2022-06-09 PROCEDURE — 84484 ASSAY OF TROPONIN QUANT: CPT | Performed by: FAMILY MEDICINE

## 2022-06-09 PROCEDURE — 85041 AUTOMATED RBC COUNT: CPT | Performed by: FAMILY MEDICINE

## 2022-06-09 PROCEDURE — 83690 ASSAY OF LIPASE: CPT | Performed by: FAMILY MEDICINE

## 2022-06-09 PROCEDURE — 85014 HEMATOCRIT: CPT | Performed by: FAMILY MEDICINE

## 2022-06-09 PROCEDURE — 99285 EMERGENCY DEPT VISIT HI MDM: CPT | Mod: 25

## 2022-06-09 PROCEDURE — 93010 ELECTROCARDIOGRAM REPORT: CPT | Performed by: FAMILY MEDICINE

## 2022-06-09 NOTE — ED TRIAGE NOTES
Had slight chest pain sensation while waking up from anesthesia at the dentist; dentist noted PVC's on cardiac monitor. Was given 5 mg midazolam and 100 mcg Fent     Triage Assessment     Row Name 06/09/22 1157       Triage Assessment (Adult)    Airway WDL WDL       Cardiac WDL    Cardiac WDL WDL       Cognitive/Neuro/Behavioral WDL    Cognitive/Neuro/Behavioral WDL WDL

## 2022-06-09 NOTE — TELEPHONE ENCOUNTER
"Nurse Triage SBAR    Is this a 2nd Level Triage? NO    Situation: Patient calling with chest pain.  Consent: not needed    Background: Pt went to dentist this morning to have several teeth extracted.  They put him under general anesthesia but ended up cancelling the extrations as they told him he had 30+ irregular beats over the course of a minute.  Pt woke up and his chest felt \"weird\" and he noted he was beginning to have chest pains.      Assessment:   * Chest pain in collar bone - upper chest - middle left.  - Doesn't radiate anywhere at this time. Pt woke up from anesthesia at dentist - originally rates pain a 3/10 when he felt it originally - 10 minutes since they left.      * Pt states it still continues to feel that he has added beats to his rhythm.      Protocol Recommended Disposition:   ED NOW     Recommendation: Advised patient to Go to ED now . Reviewed concerning symptoms and when to call back.   Pt plans to go to the [place] ED at this time.  Has a  with him.     Pati Lora RN [facility] 6/9/2022 11:33 AM    COVID 19 Nurse Triage Plan/Patient Instructions    Please be aware that novel coronavirus (COVID-19) may be circulating in the community. If you develop symptoms such as fever, cough, or SOB or if you have concerns about the presence of another infection including coronavirus (COVID-19), please contact your health care provider or visit [email or web address].     Disposition/Instructions    ED Visit recommended. Follow protocol based instructions.     Bring Your Own Device:  Please also bring your smart device(s) (smart phones, tablets, laptops) and their charging cables for your personal use and to communicate with your care team during your visit.    Thank you for taking steps to prevent the spread of this virus.  o Limit your contact with others.  o Wear a simple mask to cover your cough.  o Wash your hands well and often.    Resources    [facility]: " About COVID-19: [email or web address]    CDC: What to Do If You're Sick: www.cdc.gov/coronavirus/2019-ncov/about/steps-when-sick.html    CDC: Ending Home Isolation: www.cdc.gov/coronavirus/2019-ncov/hcp/disposition-in-home-patients.html     CDC: Caring for Someone: www.cdc.gov/coronavirus/2019-ncov/if-you-are-sick/care-for-someone.html     [facility]: Interim Guidance for Hospital Discharge to Home: www.health.[email or web address]/diseases/coronavirus/hcp/hospdischarge.pdf    [facility] clinical trials (COVID-19 research studies): [email or web address]     Below are the COVID-19 hotlines at the [place] [facility] of [facility] ([facility]). Interpreters are available.   o For health questions: Call 452-036-6291 or 1-846.854.6704 (7 a.m. to 7 p.m.)  o For questions about schools and childcare: Call 107-403-3567 or 1-656.351.4796 (7 a.m. to 7 p.m.)                         Reason for Disposition    Heart beating irregularly or very rapidly    Additional Information    Negative: Severe difficulty breathing (e.g., struggling for each breath, speaks in single words)    Negative: Passed out (i.e., fainted, collapsed and was not responding)    Negative: Difficult to awaken or acting confused (e.g., disoriented, slurred speech)    Negative: Shock suspected (e.g., cold/pale/clammy skin, too weak to stand, low BP, rapid pulse)    Negative: Chest pain lasting longer than 5 minutes and ANY of the following:* Over 45 years old* Over 30 years old and at least one cardiac risk factor (e.g., diabetes, high blood pressure, high cholesterol, smoker, or strong family history of heart disease)* History of heart disease (i.e., angina, heart attack, heart failure, bypass surgery, takes nitroglycerin)* Pain is crushing, pressure-like, or heavy    Negative: Visible sweat on face or sweat dripping down face    Negative: Sounds like a life-threatening emergency to the triager    Negative: Heart beating < 50 beats per  minute OR > 140 beats per minute    Negative: Followed an injury to chest    Negative: SEVERE chest pain    Negative: Pain also in shoulder(s) or arm(s) or jaw    Negative: Difficulty breathing    Negative: Cocaine use within last 3 days    Negative: Major surgery in the past month    Negative: Hip or leg fracture (broken bone) in past month (or had cast on leg or ankle in past month)    Negative: Illness requiring prolonged bedrest in past month (e.g., immobilization, long hospital stay)    Negative: Long-distance travel in past month (e.g., car, bus, train, plane; with trip lasting 6 or more hours)    Negative: History of prior 'blood clot' in leg or lungs (i.e., deep vein thrombosis, pulmonary embolism)    Negative: History of inherited increased risk of blood clots (e.g., Factor 5 Leiden, Anti-thrombin 3, Protein C or Protein S deficiency, Prothrombin mutation)    Protocols used: CHEST PAIN-A-[place]

## 2022-06-09 NOTE — ED NOTES
Patient was sent from [facility] sent patient to see his primary MD after discontinuing dental surgery to remove multiple teeth due to ectopy and chest pain. Chest pain has been occurring and worsening the past couple weeks. CP causes tightness and slight SOB.  He also reports worsening memory issues the past 2-3 weeks. Forgetfulness at baseline due to 2018 concussion.

## 2022-06-09 NOTE — ED PROVIDER NOTES
History     Chief Complaint   Patient presents with     Chest Pain     Had slight chest pain sensation while waking up from anesthesia at the dentist; dentist noted PVC's on cardiac monitor. Was given 5 mg midazolam and 100 mcg Fent     HPI  Savage Roberts is a 27 year old male, past medical history is significant for anxiety, autism, bipolar affective disorder, Duane syndrome, GERD, OCD, PTSD, strabismus, presents to the emergency department with concerns of chest pain at the dentist.  History is obtained from the patient and also from a note sent from his dentist with him.  The patient states he was supposed to have 7 tooth extraction performed under anesthesia and according to his dentist about 5 minutes after the administration of 5 mg of Versed and 100 mcg of fentanyl he had some type of dysrhythmia and the procedure was canceled.  The patient did not receive any local anesthetic and the patient reverted to normal sinus rhythm approximately 10minutes after the medication had been given.  The rhythm strip that accompanies the patient demonstrates a sinus rhythm with occasional PVC and I have requested support staff to scan this into the patient's medical record.  Additionally as the patient was waking up from anesthesia he described chest pain to the dentist.  This chest pain has been occurring off and on almost daily basis for the last month month and a half and the patient has not seen his primary about it.  It seems to occur irrespective of activity or eating or drinking, no change or definite occurrence with a certain position of any kind.  The chest pain is sharp last for maybe 5 to 10 minutes when it is there and then goes away.  May be a little bit more discomfort if he tries take a deep breath in but not really sure.  No lightheadedness nausea or vomiting.  No palpitations.  No abdominal pain or back pain.  There is no radiation of the pain to the neck or arms.      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     Priority: Medium        Past Medical History:    Past Medical History:   Diagnosis Date     Anxiety disorder      Autistic disorder      Bipolar affective disorder (H)      Duane syndrome      Gastro-oesophageal reflux disease      OCD (obsessive compulsive disorder)      PTSD (post-traumatic stress disorder)      Strabismus        Past Surgical History:    Past Surgical History:   Procedure Laterality Date     NISSEN FUNDOPLICATION  1/2010     RECESSION RESECTION (REPAIR STRABISMUS) BILATERAL Bilateral 1/20/2015    Procedure: RECESSION RESECTION (REPAIR STRABISMUS) BILATERAL;  Surgeon: Shaun Storey MD;  Location: [facility] OR     SEPTOPLASTY  12/2011     STRABISMUS SURGERY  6/27/13    RLRc 5 and LLRc 4mm (Daniel)       Family History:    Family History   Problem Relation Age of Onset     Neurologic Disorder Mother         nystagmus, bipolar, narcolepsy     Arthritis Mother      Strabismus Maternal Grandmother         no sx, no glasses, no patching     Multiple Sclerosis Maternal Grandfather      Multiple Sclerosis Maternal Uncle      Strabismus Other         strab sx, glasses/strab sx       Social History:  Marital Status:  Single [1]  Social History     Tobacco Use     Smoking status: Never Smoker     Smokeless tobacco: Never Used   Substance Use Topics     Alcohol use: No     Drug use: No        Medications:    allopurinol (ZYLOPRIM) 100 MG tablet          Review of Systems   All other systems reviewed and are negative.      Physical Exam   BP: (!) 150/83  Pulse: 66  Temp: 96.8  F (36  C)  Resp: 16  Height: 179.1 cm (5' 10.5\")  Weight: " 108.9 kg (240 lb)  SpO2: 100 %      Physical Exam  Vitals and nursing note reviewed.   Constitutional:       General: He is not in acute distress.     Appearance: He is well-developed. He is obese. He is not ill-appearing.   HENT:      Head: Normocephalic and atraumatic.   Eyes:      Extraocular Movements: Extraocular movements intact.      Pupils: Pupils are equal, round, and reactive to light.   Cardiovascular:      Rate and Rhythm: Normal rate and regular rhythm.      Heart sounds: Normal heart sounds.   Pulmonary:      Effort: Pulmonary effort is normal.      Breath sounds: Normal breath sounds.   Abdominal:      General: Bowel sounds are normal.      Palpations: Abdomen is soft.   Musculoskeletal:         General: Normal range of motion.      Cervical back: Normal range of motion and neck supple.   Skin:     General: Skin is warm and dry.      Capillary Refill: Capillary refill takes less than 2 seconds.   Neurological:      General: No focal deficit present.      Mental Status: He is alert.   Psychiatric:         Mood and Affect: Mood normal.         Behavior: Behavior normal.         ED Course                 Procedures              EKG Interpretation:      Interpreted by Yfn Monteor MD  Time reviewed: Time obtained 1207 time interpreted same comparison cardiogram dated 12/8/2019.  Sinus rhythm, 62 bpm, J-point elevation precordial leads.  Comparable to comparison cardiogram.  Machine read on this documents a second-degree AV block and this is not the case the patient's OK interval is 0.17 which is not prolonged.  This appears in sinus rhythm.  No acute ST-T wave changes.  Results of this EKG and comparison to previous EKG were discussed in the room with the patient.    Labs Ordered and Resulted from Time of ED Arrival to Time of ED Departure   COMPREHENSIVE METABOLIC PANEL - Abnormal       Result Value    Sodium 141      Potassium 4.7      Chloride 110 (*)     Carbon Dioxide (CO2) 27      Anion Gap  4      Urea Nitrogen 16      Creatinine 0.97      Calcium 9.3      Glucose 103 (*)     Alkaline Phosphatase 75      AST 24      ALT 57      Protein Total 8.2      Albumin 4.0      Bilirubin Total 0.4      GFR Estimate >90     CBC WITH PLATELETS AND DIFFERENTIAL - Abnormal    WBC Count 6.6      RBC Count 5.78      Hemoglobin 15.2      Hematocrit 45.7      MCV 79      MCH 26.3 (*)     MCHC 33.3      RDW 12.7      Platelet Count 252      % Neutrophils 84      % Lymphocytes 13      % Monocytes 3      % Eosinophils 0      % Basophils 0      % Immature Granulocytes 0      NRBCs per 100 WBC 0      Absolute Neutrophils 5.5      Absolute Lymphocytes 0.9      Absolute Monocytes 0.2      Absolute Eosinophils 0.0      Absolute Basophils 0.0      Absolute Immature Granulocytes 0.0      Absolute NRBCs 0.0     CRP INFLAMMATION - Normal    CRP Inflammation 3.0     LIPASE - Normal    Lipase 118     TROPONIN I - Normal    Troponin I High Sensitivity 4       XR CHEST 2 VW 6/9/2022 3:28 PM     HISTORY: Cough, short of air     COMPARISON: 9/18/2018.     FINDINGS: Clear lungs. Normal heart size and pulmonary vasculature.  Unchanged plate and screw fixation left clavicle.                                                                      IMPRESSION: Negative chest.     JUAN MCCRAY MD         SYSTEM ID:  [id number]    Critical Care time:  none   9:27 PM  Results reviewed in the room with the patient and I answered his questions.  I recommended that he have follow-up as an outpatient for consultation with cardiology.  I also placed an order for a Zio patch monitor with results to his primary care provider for review.            No results found for this or any previous visit (from the past 24 hour(s)).    Medications - No data to display    Assessments & Plan (with Medical Decision Making)     I have reviewed the nursing notes.    I have reviewed the findings, diagnosis, plan and need for follow up with the patient.          Discharge  Medication List as of 6/9/2022  4:56 PM          Final diagnoses:   Atypical chest pain   PVC's (premature ventricular contractions)       6/9/2022   [facility] EMERGENCY DEPT     Yfn Montero MD  06/12/22 [id number]

## 2022-06-09 NOTE — DISCHARGE INSTRUCTIONS
Please call for an outpatient consultation with cardiology for review.  Zio patch monitor with results to your primary care provider.  Return to the emergency department if worse or changes.

## 2022-06-13 ENCOUNTER — HOSPITAL ENCOUNTER (OUTPATIENT)
Dept: CARDIOLOGY | Facility: CLINIC | Age: 27
Discharge: HOME | End: 2022-06-13
Attending: FAMILY MEDICINE | Admitting: FAMILY MEDICINE
Payer: COMMERCIAL

## 2022-06-13 PROCEDURE — 93226 XTRNL ECG REC<48 HR SCAN A/R: CPT

## 2022-06-13 PROCEDURE — 93227 XTRNL ECG REC<48 HR R&I: CPT | Performed by: INTERNAL MEDICINE

## 2022-07-06 NOTE — PROGRESS NOTES
CARDIOLOGY CONSULT    REASON FOR CONSULT: PVCs    PRIMARY CARE PHYSICIAN:  Physician No Ref-Primary    HISTORY OF PRESENT ILLNESS:  27-year-old male seen for chest pain and PVCs.    Zio monitor 2019 showed sinus rhythm, rare PVCs.    At baseline he does some regular exercise.  He will do 30 minutes on a treadmill at up to 6 mph.  He denies any exertional chest pain or shortness of breath.  He denies excessive alcohol, caffeine, or stimulant use.    Over the past 3 months he has had intermittent chest pain occurring daily.  This occurs in his mid chest and will last 30 to 60 minutes.  It is not worse with exertion.    June 9 he was at the dentist and underwent sedation with Versed and fentanyl for tooth extraction.  Apparently PVCs were noted on the monitor, he did not have his teeth pulled.  He was directed to the ED.  He was found to have some PVCs on telemetry, but was in sinus rhythm and otherwise had normal work-up.    He has been feeling a little better recently.  He does note an occasional palpitation when he checks his pulse, but no sustained racing.  He denies any lightheadedness or syncope.    Holter monitor June 2022 showed sinus rhythm, 3% PVCs including some bigeminy, multiple palpitation symptoms.    PAST MEDICAL HISTORY:  Past Medical History:   Diagnosis Date     Anxiety disorder      Autistic disorder      Bipolar affective disorder (H)      Duane syndrome      Gastro-oesophageal reflux disease      OCD (obsessive compulsive disorder)      PTSD (post-traumatic stress disorder)      Strabismus        MEDICATIONS:  Current Outpatient Medications   Medication     allopurinol (ZYLOPRIM) 100 MG tablet     No current facility-administered medications for this visit.       ALLERGIES:  Allergies   Allergen Reactions     Dust Mites      Ipratropium Other (See Comments)     raw nose      crusted     bleeding     led to sinus infection      2/2014        SOCIAL HISTORY:  I have reviewed this patient's social  "history and updated it with pertinent information if needed. Savage Roberts  reports that he has never smoked. He has never used smokeless tobacco. He reports that he does not drink alcohol and does not use drugs.    FAMILY HISTORY:  I have reviewed this patient's family history and updated it with pertinent information if needed.   Family History   Problem Relation Age of Onset     Neurologic Disorder Mother         nystagmus, bipolar, narcolepsy     Arthritis Mother      Strabismus Maternal Grandmother         no sx, no glasses, no patching     Multiple Sclerosis Maternal Grandfather      Multiple Sclerosis Maternal Uncle      Strabismus Other         strab sx, glasses/strab sx       REVIEW OF SYSTEMS:  Constitutional:  No weight loss, fever, chills  HEENT:  Eyes:  No visual loss, blurred vision, double vision or yellow sclerae. No hearing loss, sneezing, congestion, runny nose or sore throat.  Skin:  No rash or itching.  Cardiovascular: per HPI  Respiratory: per HPI  GI:  No anorexia, nausea, vomiting or diarrhea. No abdominal pain or blood.  :  No dysurea, hematuria  Neurologic:  No headache, paralysis, ataxia, numbness or tingling in the extremities. No change in bowel or bladder control.  Musculoskeletal:  No muscle pain  Hematologic:  No bleeding or bruising.  Lymphatics:  No enlarged nodes. No history of splenectomy.  Endocrine:  No reports of sweating, cold or heat intolerance. No polyuria or polydipsia.  Allergies:  No history of asthma, hives, eczema or rhinitis.    PHYSICAL EXAM:  /86 (BP Location: Right arm, Patient Position: Sitting, Cuff Size: Adult Regular)   Pulse 62   Ht 1.778 m (5' 10\")   Wt 112.9 kg (249 lb)   SpO2 100%   BMI 35.73 kg/m      Constitutional: awake, alert, no distress  Eyes: PERRL, sclera nonicteric  ENT: trachea midline  Respiratory: Lungs clear  Cardiovascular: Regular rate and rhythm, no murmur, no ectopy  GI: nondistended, nontender, bowel sounds " present  Lymph/Hematologic: no lymphadenopathy  Skin: dry, no rash  Musculoskeletal: good muscle tone, strength 5/5 in upper and lower extremities  Neurologic: no focal deficits  Neuropsychiatric: appropriate affact    DATA:  Labs:   June 2022: Potassium 4.7, creatinine 1.0, hemoglobin 15    EKG, June 9, 2022: Sinus rhythm, normal EKG    ASSESSMENT:  27-year-old male seen for chest pain and PVCs.  His chest pain is very atypical, it is not exertional.  At his age, it would be very unusual to have any CAD.  He had a 3% PVC burden on his monitor from June, interestingly most of his PVCs were clustered in a span of about 8 hours.  He has no evidence of heart failure, no syncope, and no family history of hypertrophic cardiomyopathy or similar genetic cardiac conditions.    A stress echo will be done.  If heart is structurally normal and there is no significant arrhythmia on stress echo, would not pursue further testing at this time.  If any questionable findings of the right ventricle, cardiac MRI could be done to rule out RV dysplasia.    RECOMMENDATIONS:  1.  PVCs with atypical chest pain  -Treadmill stress echo    Follow-up as needed based on test results.    Jovani Thornton MD  Cardiology - [facility]  Pager:  [phone]  Text Page  July 8, 2022

## 2022-07-07 ENCOUNTER — HOSPITAL ENCOUNTER (EMERGENCY)
Facility: CLINIC | Age: 27
Discharge: HOME | End: 2022-07-07
Attending: FAMILY MEDICINE | Admitting: FAMILY MEDICINE
Payer: COMMERCIAL

## 2022-07-07 ENCOUNTER — APPOINTMENT (OUTPATIENT)
Dept: CT IMAGING | Facility: CLINIC | Age: 27
End: 2022-07-07
Attending: FAMILY MEDICINE
Payer: COMMERCIAL

## 2022-07-07 ENCOUNTER — TELEPHONE (OUTPATIENT)
Dept: FAMILY MEDICINE | Facility: CLINIC | Age: 27
End: 2022-07-07

## 2022-07-07 VITALS
TEMPERATURE: 97.4 F | WEIGHT: 240 LBS | RESPIRATION RATE: 16 BRPM | HEART RATE: 62 BPM | DIASTOLIC BLOOD PRESSURE: 75 MMHG | BODY MASS INDEX: 34.36 KG/M2 | SYSTOLIC BLOOD PRESSURE: 152 MMHG | OXYGEN SATURATION: 98 % | HEIGHT: 70 IN

## 2022-07-07 DIAGNOSIS — R10.31 INGUINAL PAIN, RIGHT: ICD-10-CM

## 2022-07-07 DIAGNOSIS — R10.9 FLANK PAIN: ICD-10-CM

## 2022-07-07 DIAGNOSIS — R71.8 MICROCYTIC RED BLOOD CELLS: ICD-10-CM

## 2022-07-07 LAB
ALBUMIN SERPL-MCNC: 3.9 G/DL (ref 3.4–5)
ALBUMIN UR-MCNC: NEGATIVE MG/DL
ALP SERPL-CCNC: 68 U/L (ref 40–150)
ALT SERPL W P-5'-P-CCNC: 58 U/L (ref 0–70)
ANION GAP SERPL CALCULATED.3IONS-SCNC: 2 MMOL/L (ref 3–14)
APPEARANCE UR: CLEAR
AST SERPL W P-5'-P-CCNC: 36 U/L (ref 0–45)
BACTERIA #/AREA URNS HPF: ABNORMAL /HPF
BASOPHILS # BLD AUTO: 0 10E3/UL (ref 0–0.2)
BASOPHILS NFR BLD AUTO: 1 %
BILIRUB SERPL-MCNC: 0.6 MG/DL (ref 0.2–1.3)
BILIRUB UR QL STRIP: NEGATIVE
BUN SERPL-MCNC: 11 MG/DL (ref 7–30)
CALCIUM SERPL-MCNC: 9 MG/DL (ref 8.5–10.1)
CHLORIDE BLD-SCNC: 105 MMOL/L (ref 94–109)
CO2 SERPL-SCNC: 29 MMOL/L (ref 20–32)
COLOR UR AUTO: YELLOW
CREAT SERPL-MCNC: 0.9 MG/DL (ref 0.66–1.25)
EOSINOPHIL # BLD AUTO: 0 10E3/UL (ref 0–0.7)
EOSINOPHIL NFR BLD AUTO: 0 %
ERYTHROCYTE [DISTWIDTH] IN BLOOD BY AUTOMATED COUNT: 12.3 % (ref 10–15)
FERRITIN SERPL-MCNC: 237 NG/ML (ref 26–388)
GFR SERPL CREATININE-BSD FRML MDRD: >90 ML/MIN/1.73M2
GLUCOSE BLD-MCNC: 90 MG/DL (ref 70–99)
GLUCOSE UR STRIP-MCNC: NEGATIVE MG/DL
HCT VFR BLD AUTO: 43.8 % (ref 40–53)
HGB BLD-MCNC: 14.8 G/DL (ref 13.3–17.7)
HGB UR QL STRIP: NEGATIVE
IMM GRANULOCYTES # BLD: 0 10E3/UL
IMM GRANULOCYTES NFR BLD: 0 %
IRON SATN MFR SERPL: 31 % (ref 15–46)
IRON SERPL-MCNC: 96 UG/DL (ref 35–180)
KETONES UR STRIP-MCNC: NEGATIVE MG/DL
LACTATE SERPL-SCNC: 1.1 MMOL/L (ref 0.7–2)
LEUKOCYTE ESTERASE UR QL STRIP: NEGATIVE
LIPASE SERPL-CCNC: 124 U/L (ref 73–393)
LYMPHOCYTES # BLD AUTO: 1.7 10E3/UL (ref 0.8–5.3)
LYMPHOCYTES NFR BLD AUTO: 31 %
MCH RBC QN AUTO: 26.4 PG (ref 26.5–33)
MCHC RBC AUTO-ENTMCNC: 33.8 G/DL (ref 31.5–36.5)
MCV RBC AUTO: 78 FL (ref 78–100)
MONOCYTES # BLD AUTO: 0.4 10E3/UL (ref 0–1.3)
MONOCYTES NFR BLD AUTO: 8 %
MUCOUS THREADS #/AREA URNS LPF: PRESENT /LPF
NEUTROPHILS # BLD AUTO: 3.3 10E3/UL (ref 1.6–8.3)
NEUTROPHILS NFR BLD AUTO: 60 %
NITRATE UR QL: NEGATIVE
NRBC # BLD AUTO: 0 10E3/UL
NRBC BLD AUTO-RTO: 0 /100
PH UR STRIP: 7 [PH] (ref 5–7)
PLATELET # BLD AUTO: 251 10E3/UL (ref 150–450)
POTASSIUM BLD-SCNC: 4.2 MMOL/L (ref 3.4–5.3)
PROT SERPL-MCNC: 7.5 G/DL (ref 6.8–8.8)
RBC # BLD AUTO: 5.6 10E6/UL (ref 4.4–5.9)
RBC URINE: <1 /HPF
SODIUM SERPL-SCNC: 136 MMOL/L (ref 133–144)
SP GR UR STRIP: 1.01 (ref 1–1.03)
TIBC SERPL-MCNC: 312 UG/DL (ref 240–430)
UROBILINOGEN UR STRIP-MCNC: NORMAL MG/DL
WBC # BLD AUTO: 5.5 10E3/UL (ref 4–11)
WBC URINE: <1 /HPF

## 2022-07-07 PROCEDURE — 83550 IRON BINDING TEST: CPT | Performed by: FAMILY MEDICINE

## 2022-07-07 PROCEDURE — 250N000009 HC RX 250: Performed by: FAMILY MEDICINE

## 2022-07-07 PROCEDURE — 81001 URINALYSIS AUTO W/SCOPE: CPT | Performed by: FAMILY MEDICINE

## 2022-07-07 PROCEDURE — 99285 EMERGENCY DEPT VISIT HI MDM: CPT | Mod: 25 | Performed by: FAMILY MEDICINE

## 2022-07-07 PROCEDURE — 82040 ASSAY OF SERUM ALBUMIN: CPT | Performed by: FAMILY MEDICINE

## 2022-07-07 PROCEDURE — 74177 CT ABD & PELVIS W/CONTRAST: CPT

## 2022-07-07 PROCEDURE — 82728 ASSAY OF FERRITIN: CPT | Performed by: FAMILY MEDICINE

## 2022-07-07 PROCEDURE — 99284 EMERGENCY DEPT VISIT MOD MDM: CPT | Performed by: FAMILY MEDICINE

## 2022-07-07 PROCEDURE — 250N000011 HC RX IP 250 OP 636: Performed by: FAMILY MEDICINE

## 2022-07-07 PROCEDURE — 80053 COMPREHEN METABOLIC PANEL: CPT | Performed by: FAMILY MEDICINE

## 2022-07-07 PROCEDURE — 83690 ASSAY OF LIPASE: CPT | Performed by: FAMILY MEDICINE

## 2022-07-07 PROCEDURE — 85025 COMPLETE CBC W/AUTO DIFF WBC: CPT | Performed by: FAMILY MEDICINE

## 2022-07-07 PROCEDURE — 36415 COLL VENOUS BLD VENIPUNCTURE: CPT | Performed by: FAMILY MEDICINE

## 2022-07-07 PROCEDURE — 83605 ASSAY OF LACTIC ACID: CPT | Performed by: FAMILY MEDICINE

## 2022-07-07 RX ORDER — IOPAMIDOL 755 MG/ML
90 INJECTION, SOLUTION INTRAVASCULAR ONCE
Status: COMPLETED | OUTPATIENT
Start: 2022-07-07 | End: 2022-07-07

## 2022-07-07 RX ADMIN — IOPAMIDOL 90 ML: 755 INJECTION, SOLUTION INTRAVENOUS at 16:30

## 2022-07-07 RX ADMIN — SODIUM CHLORIDE 69 ML: 9 INJECTION, SOLUTION INTRAVENOUS at 16:31

## 2022-07-07 ASSESSMENT — ENCOUNTER SYMPTOMS
CHILLS: 0
COUGH: 0
BLOOD IN STOOL: 0
NAUSEA: 0
DYSURIA: 0
HEADACHES: 0
CONSTIPATION: 0
WHEEZING: 0
DIAPHORESIS: 0
FEVER: 0
FLANK PAIN: 1
SHORTNESS OF BREATH: 0
SORE THROAT: 0
PALPITATIONS: 0
VOMITING: 0
FREQUENCY: 0
SINUS PRESSURE: 0
DIARRHEA: 0
ABDOMINAL PAIN: 1

## 2022-07-07 NOTE — TELEPHONE ENCOUNTER
S-(situation): C/) upper rt quad/ lower ribcage, upper bladder pain x5-6 days, blood in urine. Thinks may have kidney stone. Requesting appt.     B-(background): No hx kidney stones. Started allopurinol 04-26-22. Pushing fluids, taking ibu for pain.     A-(assessment): Onset pain over past wknd, rated 9/10 over wknd, 4/10 now, fairly constant. Noted blood in urine with onset sx, urine cleared. No clots. Today urine orange/ cloudy in color. Reports blurry vision, nausea, diarrhea when pain at worst. Reports diarrhea persists. Had chills last mel, no temp check; sweating. States urine output greater than fluid intake. Denies pain when passing urine.        R-(recommendations): Advise ED for eval as may need imaging/ lab UC unable to provide based on sx. Pt voices understanding/ agreement.   KRISTIN Matthews RN

## 2022-07-07 NOTE — ED TRIAGE NOTES
Concerned about right sided flank pain, recent history of gout     Triage Assessment     Row Name 07/07/22 1420       Triage Assessment (Adult)    Airway WDL WDL       Cardiac WDL    Cardiac WDL WDL       Cognitive/Neuro/Behavioral WDL    Cognitive/Neuro/Behavioral WDL WDL

## 2022-07-07 NOTE — Clinical Note
Savage Roberts was seen and treated in our emergency department on 7/7/2022.  He may return to work on 07/09/2022.       If you have any questions or concerns, please don't hesitate to call.      Mukul Duran MD

## 2022-07-07 NOTE — ED PROVIDER NOTES
History     Chief Complaint   Patient presents with     Flank Pain     Concerned about right sided flank pain, recent history of gout     HPI  Savage Roberts is a 27 year old male who presents with a history of Duane syndrome, recent diagnosis of gout requiring allopurinol start.  He had starting about 2 weeks ago pain in the inguinal region on the right side and then radiating to his flank region.  2 episodes of what look like blood in his urine during this time.  Frequent urination without urgency or dysuria.  No associated fever.  And then developed a pain now into his scrotum after some of his pain in the flank and inguinal region had decreased and a persistent pain now in the scrotum with fullness sensation worse with sitting.  He is tolerating food and fluids.  No nausea vomiting.  Variable stool consistency and some constipation.    Allergies:  Allergies   Allergen Reactions     Dust Mites      Ipratropium Other (See Comments)     raw nose      crusted     bleeding     led to sinus infection      2/2014        Problem List:    Patient Active Problem List    Diagnosis Date Noted     Left knee pain, unspecified chronicity 05/07/2021     Priority: Medium     Microcytosis 10/10/2019     Priority: Medium     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     Priority: Medium        Past Medical History:    Past Medical History:   Diagnosis Date     Anxiety disorder      Autistic disorder      Bipolar affective disorder (H)      Duane syndrome      Gastro-oesophageal reflux disease      OCD (obsessive compulsive disorder)      PTSD (post-traumatic stress disorder)      Strabismus        Past Surgical History:    Past Surgical History:   Procedure Laterality Date     NISSEN FUNDOPLICATION  1/2010     RECESSION RESECTION (REPAIR STRABISMUS) BILATERAL Bilateral 1/20/2015    Procedure: RECESSION RESECTION (REPAIR STRABISMUS) BILATERAL;  Surgeon: Shaun Storey MD;  Location: [facility]     SEPTOPLASTY  12/2011     STRABISMUS  "SURGERY  6/27/13    RLRc 5 and LLRc 4mm (Daniel)       Family History:    Family History   Problem Relation Age of Onset     Neurologic Disorder Mother         nystagmus, bipolar, narcolepsy     Arthritis Mother      Strabismus Maternal Grandmother         no sx, no glasses, no patching     Multiple Sclerosis Maternal Grandfather      Multiple Sclerosis Maternal Uncle      Strabismus Other         strab sx, glasses/strab sx       Social History:  Marital Status:  Single [1]  Social History     Tobacco Use     Smoking status: Never Smoker     Smokeless tobacco: Never Used   Substance Use Topics     Alcohol use: No     Drug use: No        Medications:    allopurinol (ZYLOPRIM) 100 MG tablet          Review of Systems   Constitutional: Negative for chills, diaphoresis and fever.   HENT: Negative for ear pain, sinus pressure and sore throat.    Eyes: Negative for visual disturbance.   Respiratory: Negative for cough, shortness of breath and wheezing.    Cardiovascular: Negative for chest pain and palpitations.   Gastrointestinal: Positive for abdominal pain. Negative for blood in stool, constipation, diarrhea, nausea and vomiting.   Genitourinary: Positive for flank pain. Negative for dysuria, frequency and urgency.   Skin: Negative for rash.   Neurological: Negative for headaches.   All other systems reviewed and are negative.      Physical Exam   BP: (!) 152/75  Pulse: 62  Temp: 97.4  F (36.3  C)  Resp: 16  Height: 176.5 cm (5' 9.5\")  Weight: 108.9 kg (240 lb)  SpO2: 98 %      Physical Exam  Constitutional:       General: He is in acute distress.      Appearance: He is not diaphoretic.   HENT:      Head: Atraumatic.   Eyes:      Conjunctiva/sclera: Conjunctivae normal.   Cardiovascular:      Rate and Rhythm: Normal rate and regular rhythm.      Heart sounds: No murmur heard.  Pulmonary:      Effort: Pulmonary effort is normal. No respiratory distress.      Breath sounds: Normal breath sounds. No stridor. No wheezing or " rhonchi.   Abdominal:      General: Abdomen is flat. There is no distension.      Palpations: There is no mass.      Tenderness: There is no abdominal tenderness. There is right CVA tenderness. There is no left CVA tenderness or guarding.   Musculoskeletal:      Cervical back: Neck supple.      Right lower leg: No edema.      Left lower leg: No edema.   Skin:     Coloration: Skin is not pale.      Findings: No rash.   Neurological:      General: No focal deficit present.      Mental Status: He is alert.      Motor: No weakness.     Inguinal hernia on the right side interact.  I can feel bowel here.  Tender to touch.      ED Course                 Procedures              Critical Care time:  none               Results for orders placed or performed during the hospital encounter of 07/07/22 (from the past 24 hour(s))   UA with Microscopic reflex to Culture    Specimen: Urine, Clean Catch   Result Value Ref Range    Color Urine Yellow Colorless, Straw, Light Yellow, Yellow    Appearance Urine Clear Clear    Glucose Urine Negative Negative mg/dL    Bilirubin Urine Negative Negative    Ketones Urine Negative Negative mg/dL    Specific Gravity Urine 1.013 1.003 - 1.035    Blood Urine Negative Negative    pH Urine 7.0 5.0 - 7.0    Protein Albumin Urine Negative Negative mg/dL    Urobilinogen Urine Normal Normal, 2.0 mg/dL    Nitrite Urine Negative Negative    Leukocyte Esterase Urine Negative Negative    Bacteria Urine Few (A) None Seen /HPF    Mucus Urine Present (A) None Seen /LPF    RBC Urine <1 <=2 /HPF    WBC Urine <1 <=5 /HPF    Narrative    Urine Culture not indicated   CBC with platelets differential    Narrative    The following orders were created for panel order CBC with platelets differential.  Procedure                               Abnormality         Status                     ---------                               -----------         ------                     CBC with platelets and d...[[id number]]   Abnormal            Final result                 Please view results for these tests on the individual orders.   Comprehensive metabolic panel   Result Value Ref Range    Sodium 136 133 - 144 mmol/L    Potassium 4.2 3.4 - 5.3 mmol/L    Chloride 105 94 - 109 mmol/L    Carbon Dioxide (CO2) 29 20 - 32 mmol/L    Anion Gap 2 (L) 3 - 14 mmol/L    Urea Nitrogen 11 7 - 30 mg/dL    Creatinine 0.90 0.66 - 1.25 mg/dL    Calcium 9.0 8.5 - 10.1 mg/dL    Glucose 90 70 - 99 mg/dL    Alkaline Phosphatase 68 40 - 150 U/L    AST 36 0 - 45 U/L    ALT 58 0 - 70 U/L    Protein Total 7.5 6.8 - 8.8 g/dL    Albumin 3.9 3.4 - 5.0 g/dL    Bilirubin Total 0.6 0.2 - 1.3 mg/dL    GFR Estimate >90 >60 mL/min/1.73m2   Lipase   Result Value Ref Range    Lipase 124 73 - 393 U/L   Lactic acid whole blood   Result Value Ref Range    Lactic Acid 1.1 0.7 - 2.0 mmol/L   CBC with platelets and differential   Result Value Ref Range    WBC Count 5.5 4.0 - 11.0 10e3/uL    RBC Count 5.60 4.40 - 5.90 10e6/uL    Hemoglobin 14.8 13.3 - 17.7 g/dL    Hematocrit 43.8 40.0 - 53.0 %    MCV 78 78 - 100 fL    MCH 26.4 (L) 26.5 - 33.0 pg    MCHC 33.8 31.5 - 36.5 g/dL    RDW 12.3 10.0 - 15.0 %    Platelet Count 251 150 - 450 10e3/uL    % Neutrophils 60 %    % Lymphocytes 31 %    % Monocytes 8 %    % Eosinophils 0 %    % Basophils 1 %    % Immature Granulocytes 0 %    NRBCs per 100 WBC 0 <1 /100    Absolute Neutrophils 3.3 1.6 - 8.3 10e3/uL    Absolute Lymphocytes 1.7 0.8 - 5.3 10e3/uL    Absolute Monocytes 0.4 0.0 - 1.3 10e3/uL    Absolute Eosinophils 0.0 0.0 - 0.7 10e3/uL    Absolute Basophils 0.0 0.0 - 0.2 10e3/uL    Absolute Immature Granulocytes 0.0 <=0.4 10e3/uL    Absolute NRBCs 0.0 10e3/uL   Ferritin   Result Value Ref Range    Ferritin 237 26 - 388 ng/mL   Iron and iron binding capacity   Result Value Ref Range    Iron 96 35 - 180 ug/dL    Iron Binding Capacity 312 240 - 430 ug/dL    Iron Sat Index 31 15 - 46 %   CT Abdomen Pelvis w Contrast    Narrative    CT  ABDOMEN PELVIS WITH CONTRAST 7/7/2022 4:39 PM    CLINICAL HISTORY: Flank pain right as well as likely inguinal hernia  indirect on the right, resulting in progressive pain. Evaluate for  incarceration or ureteral stone.    TECHNIQUE: CT scan of the abdomen and pelvis was performed following  injection of IV contrast. Multiplanar reformats were obtained. Dose  reduction techniques were used.  CONTRAST: 90 mL Isovue 370    COMPARISON: None.    FINDINGS:   LOWER CHEST: Small hiatal hernia.    HEPATOBILIARY: Hepatic steatosis. No acute liver or gallbladder  abnormality.    PANCREAS: Normal.    SPLEEN: Normal.    ADRENAL GLANDS: Normal.    KIDNEYS/BLADDER: No urolithiasis or hydronephrosis. No acute renal  abnormality. Bladder is unremarkable.    BOWEL: Normal appendix. No acute inflammatory change of the bowel. No  obstruction. No free fluid or free air.    PELVIC ORGANS: No right or left inguinal hernia, fluid collection,  mass, or adenopathy is seen. No acute abnormality in these regions  identified.    ADDITIONAL FINDINGS: None.    MUSCULOSKELETAL: No acute abnormality.      Impression    IMPRESSION:   1.  No specific acute abnormality can be seen. No inguinal region  hernia, fluid collection, or mass identified.  2.  Hiatal hernia is present.  3.  Fatty liver.        Medications   iopamidol (ISOVUE-370) solution 90 mL (has no administration in time range)   sodium chloride 0.9 % bag 500mL for CT scan flush use (has no administration in time range)       Assessments & Plan (with Medical Decision Making)     MDM: Savage Roberts is a 27 year old male presents with both flank pain and pain radiating into his inguinal region as well as recent hematuria but then also now with a finding of inguinal hernia on examination right side likely indirect with likely bowel and a feeling inside of the scrotum.  Has worse pain with sitting.  The combination is atypical and we will pursue CT of the abdomen and pelvis to exclude  mother still has a retained ureteral stone and whether there is other changes within the intra-abdominal cavity including whether this could be incarcerated which I am doubtful.  Will obtain lactic acid CBC comprehensive panel lipase urinalysis and CT.      Imaging is reassuring.  I see no findings of ureteral stone or inguinal hernia on CT although I am still suspicious that there is clinically a inguinal hernia.  We discussed following up with general surgery differentiate inguinal hernia from possible nerve impingement.  Discussed management as below.  Precautions given for return.    I have reviewed the nursing notes.    I have reviewed the findings, diagnosis, plan and need for follow up with the patient.       New Prescriptions    No medications on file       Final diagnoses:   Inguinal pain, right - no serious findings on CT.  no obvious hernia seen on CT, but I suspect one is present of this is a inguinal nerve that also cause this. continue high fiber and fluids.   Flank pain - no sign of ureteral stone.  could be lat dorsi muscle or similar   Microcytic red blood cells - these are also pale - I am suspicious that you are iron deficient and have sent labs to check on this.  however, if this is present we should determine where you are losing blood       7/7/2022   [facility] EMERGENCY DEPT     Mukul Duran MD  07/08/22 0112

## 2022-07-07 NOTE — DISCHARGE INSTRUCTIONS
ICD-10-CM    1. Inguinal pain, right  R10.31 Primary Care Referral     Adult General Surg Referral    no serious findings on CT.  no obvious hernia seen on CT, but I suspect one is present of this is a inguinal nerve that also cause this. continue high fiber and fluids.   2. Flank pain  R10.9 Primary Care Referral    no sign of ureteral stone.  could be lat dorsi muscle or similar   3. Microcytic red blood cells  R71.8 Primary Care Referral    these are also pale - I am suspicious that you are iron deficient and have sent labs to check on this.  however, if this is present we should determine where you are losing blood

## 2022-07-08 ENCOUNTER — OFFICE VISIT (OUTPATIENT)
Dept: CARDIOLOGY | Facility: CLINIC | Age: 27
End: 2022-07-08
Attending: FAMILY MEDICINE
Payer: COMMERCIAL

## 2022-07-08 VITALS
DIASTOLIC BLOOD PRESSURE: 86 MMHG | WEIGHT: 249 LBS | SYSTOLIC BLOOD PRESSURE: 136 MMHG | HEIGHT: 70 IN | BODY MASS INDEX: 35.65 KG/M2 | HEART RATE: 62 BPM | OXYGEN SATURATION: 100 %

## 2022-07-08 DIAGNOSIS — R07.89 ATYPICAL CHEST PAIN: ICD-10-CM

## 2022-07-08 DIAGNOSIS — I49.3 PVC'S (PREMATURE VENTRICULAR CONTRACTIONS): ICD-10-CM

## 2022-07-08 PROCEDURE — 99203 OFFICE O/P NEW LOW 30 MIN: CPT | Performed by: INTERNAL MEDICINE

## 2022-07-08 NOTE — LETTER
7/8/2022    Physician No Ref-Primary  No address on file    RE: Savage Roberts       Dear Colleague,     I had the pleasure of seeing Savage Roberts in the [facility].  CARDIOLOGY CONSULT    REASON FOR CONSULT: PVCs    PRIMARY CARE PHYSICIAN:  Physician No Ref-Primary    HISTORY OF PRESENT ILLNESS:  27-year-old male seen for chest pain and PVCs.    Zio monitor 2019 showed sinus rhythm, rare PVCs.    At baseline he does some regular exercise.  He will do 30 minutes on a treadmill at up to 6 mph.  He denies any exertional chest pain or shortness of breath.  He denies excessive alcohol, caffeine, or stimulant use.    Over the past 3 months he has had intermittent chest pain occurring daily.  This occurs in his mid chest and will last 30 to 60 minutes.  It is not worse with exertion.    June 9 he was at the dentist and underwent sedation with Versed and fentanyl for tooth extraction.  Apparently PVCs were noted on the monitor, he did not have his teeth pulled.  He was directed to the ED.  He was found to have some PVCs on telemetry, but was in sinus rhythm and otherwise had normal work-up.    He has been feeling a little better recently.  He does note an occasional palpitation when he checks his pulse, but no sustained racing.  He denies any lightheadedness or syncope.    Holter monitor June 2022 showed sinus rhythm, 3% PVCs including some bigeminy, multiple palpitation symptoms.    PAST MEDICAL HISTORY:  Past Medical History:   Diagnosis Date     Anxiety disorder      Autistic disorder      Bipolar affective disorder (H)      Duane syndrome      Gastro-oesophageal reflux disease      OCD (obsessive compulsive disorder)      PTSD (post-traumatic stress disorder)      Strabismus        MEDICATIONS:  Current Outpatient Medications   Medication     allopurinol (ZYLOPRIM) 100 MG tablet     No current facility-administered medications for this visit.       ALLERGIES:  Allergies   Allergen  "Reactions     Dust Mites      Ipratropium Other (See Comments)     raw nose      crusted     bleeding     led to sinus infection      2/2014        SOCIAL HISTORY:  I have reviewed this patient's social history and updated it with pertinent information if needed. Savage Roberts  reports that he has never smoked. He has never used smokeless tobacco. He reports that he does not drink alcohol and does not use drugs.    FAMILY HISTORY:  I have reviewed this patient's family history and updated it with pertinent information if needed.   Family History   Problem Relation Age of Onset     Neurologic Disorder Mother         nystagmus, bipolar, narcolepsy     Arthritis Mother      Strabismus Maternal Grandmother         no sx, no glasses, no patching     Multiple Sclerosis Maternal Grandfather      Multiple Sclerosis Maternal Uncle      Strabismus Other         strab sx, glasses/strab sx       REVIEW OF SYSTEMS:  Constitutional:  No weight loss, fever, chills  HEENT:  Eyes:  No visual loss, blurred vision, double vision or yellow sclerae. No hearing loss, sneezing, congestion, runny nose or sore throat.  Skin:  No rash or itching.  Cardiovascular: per HPI  Respiratory: per HPI  GI:  No anorexia, nausea, vomiting or diarrhea. No abdominal pain or blood.  :  No dysurea, hematuria  Neurologic:  No headache, paralysis, ataxia, numbness or tingling in the extremities. No change in bowel or bladder control.  Musculoskeletal:  No muscle pain  Hematologic:  No bleeding or bruising.  Lymphatics:  No enlarged nodes. No history of splenectomy.  Endocrine:  No reports of sweating, cold or heat intolerance. No polyuria or polydipsia.  Allergies:  No history of asthma, hives, eczema or rhinitis.    PHYSICAL EXAM:  /86 (BP Location: Right arm, Patient Position: Sitting, Cuff Size: Adult Regular)   Pulse 62   Ht 1.778 m (5' 10\")   Wt 112.9 kg (249 lb)   SpO2 100%   BMI 35.73 kg/m      Constitutional: awake, alert, no " distress  Eyes: PERRL, sclera nonicteric  ENT: trachea midline  Respiratory: Lungs clear  Cardiovascular: Regular rate and rhythm, no murmur, no ectopy  GI: nondistended, nontender, bowel sounds present  Lymph/Hematologic: no lymphadenopathy  Skin: dry, no rash  Musculoskeletal: good muscle tone, strength 5/5 in upper and lower extremities  Neurologic: no focal deficits  Neuropsychiatric: appropriate affact    DATA:  Labs:   June 2022: Potassium 4.7, creatinine 1.0, hemoglobin 15    EKG, June 9, 2022: Sinus rhythm, normal EKG    ASSESSMENT:  27-year-old male seen for chest pain and PVCs.  His chest pain is very atypical, it is not exertional.  At his age, it would be very unusual to have any CAD.  He had a 3% PVC burden on his monitor from June, interestingly most of his PVCs were clustered in a span of about 8 hours.  He has no evidence of heart failure, no syncope, and no family history of hypertrophic cardiomyopathy or similar genetic cardiac conditions.    A stress echo will be done.  If heart is structurally normal and there is no significant arrhythmia on stress echo, would not pursue further testing at this time.  If any questionable findings of the right ventricle, cardiac MRI could be done to rule out RV dysplasia.    RECOMMENDATIONS:  1.  PVCs with atypical chest pain  -Treadmill stress echo    Follow-up as needed based on test results.    Jovani Thornton MD  Cardiology - [facility]  Pager:  [phone]  Text Page  July 8, 2022      Thank you for allowing me to participate in the care of your patient.      Sincerely,     Jovani Thornton MD     [facility]  cc:   Yfn Montero MD  [address] [place] [address]

## 2022-07-08 NOTE — PATIENT INSTRUCTIONS
Your recent heart monitor showed that your heart was in sinus rhythm, which is a normal rhythm.  You had some PVCs, which are premature beats.  Overall you had 3% PVCs, normal would be about 1% or less.  This means you are having some extra heartbeats coming from the bottom of your heart, this can cause palpitations.    I would like you to come in for a stress test.  This rules out any blockage in the heart artery and assesses your heart under a workload of exercise.    Treadmill stress echocardiogram  Will schedule a treadmill echo stress test.  This is a test where we take some baseline pictures of your heart with an ultrasound.  You will then exercise on the treadmill while hooked up to an EKG machine.  We monitored you heart rate and blood pressure.  We will have you exercise long enough to reach a certain target heart rate.  Immediately after exercise, additional pictures are taken of your heart with the ultrasound.    If there are any severe blockages in the heart, there may be changes on the EKG or the heart pictures may look abnormal.  If the test is abnormal, often a coronary angiogram is recommended at a later time.  This is the definitive test looking for blockages in the heart and potentially fixing them with stents.    Overall the test takes 30-45 minutes.  You will probably be on the treadmill for 5-12 minutes.  Wear some comfortable clothes and shoes for doing some light exercise.    Your test will be at [facility] cardiology clinic.

## 2022-07-08 NOTE — NURSING NOTE
"Initial /86 (BP Location: Right arm, Patient Position: Sitting, Cuff Size: Adult Regular)   Pulse 62   Ht 1.778 m (5' 10\")   Wt 112.9 kg (249 lb)   SpO2 100%   BMI 35.73 kg/m   Estimated body mass index is 35.73 kg/m  as calculated from the following:    Height as of this encounter: 1.778 m (5' 10\").    Weight as of this encounter: 112.9 kg (249 lb). .      "

## 2022-07-18 ENCOUNTER — OFFICE VISIT (OUTPATIENT)
Dept: FAMILY MEDICINE | Facility: CLINIC | Age: 27
End: 2022-07-18
Attending: FAMILY MEDICINE
Payer: COMMERCIAL

## 2022-07-18 VITALS
SYSTOLIC BLOOD PRESSURE: 128 MMHG | BODY MASS INDEX: 35 KG/M2 | RESPIRATION RATE: 18 BRPM | HEIGHT: 71 IN | HEART RATE: 80 BPM | TEMPERATURE: 97.3 F | WEIGHT: 250 LBS | DIASTOLIC BLOOD PRESSURE: 70 MMHG

## 2022-07-18 DIAGNOSIS — R10.31 INGUINAL PAIN, RIGHT: ICD-10-CM

## 2022-07-18 DIAGNOSIS — K44.9 HIATAL HERNIA: Primary | ICD-10-CM

## 2022-07-18 DIAGNOSIS — R10.9 FLANK PAIN: ICD-10-CM

## 2022-07-18 DIAGNOSIS — R71.8 MICROCYTIC RED BLOOD CELLS: ICD-10-CM

## 2022-07-18 LAB
ERYTHROCYTE [DISTWIDTH] IN BLOOD BY AUTOMATED COUNT: 12.2 % (ref 10–15)
HCT VFR BLD AUTO: 42.3 % (ref 40–53)
HGB BLD-MCNC: 14.4 G/DL (ref 13.3–17.7)
MCH RBC QN AUTO: 26.5 PG (ref 26.5–33)
MCHC RBC AUTO-ENTMCNC: 34 G/DL (ref 31.5–36.5)
MCV RBC AUTO: 78 FL (ref 78–100)
PLATELET # BLD AUTO: 237 10E3/UL (ref 150–450)
RBC # BLD AUTO: 5.43 10E6/UL (ref 4.4–5.9)
WBC # BLD AUTO: 6.3 10E3/UL (ref 4–11)

## 2022-07-18 PROCEDURE — 85027 COMPLETE CBC AUTOMATED: CPT | Performed by: NURSE PRACTITIONER

## 2022-07-18 PROCEDURE — 99214 OFFICE O/P EST MOD 30 MIN: CPT | Performed by: NURSE PRACTITIONER

## 2022-07-18 PROCEDURE — 36415 COLL VENOUS BLD VENIPUNCTURE: CPT | Performed by: NURSE PRACTITIONER

## 2022-07-18 NOTE — LETTER
My Depression Action Plan  Name: Savage Roberts   Date of Birth 1995  Date: 7/18/2022    My doctor: No Ref-Primary, Physician   My clinic: [id number] [address] [place] [id number] [phone]          GREEN    ZONE   Good Control    What it looks like:     Things are going generally well. You have normal ups and downs. You may even feel depressed from time to time, but bad moods usually last less than a day.   What you need to do:  1. Continue to care for yourself (see self care plan)  2. Check your depression survival kit and update it as needed  3. Follow your physician s recommendations including any medication.  4. Do not stop taking medication unless you consult with your physician first.           YELLOW         ZONE Getting Worse    What it looks like:     Depression is starting to interfere with your life.     It may be hard to get out of bed; you may be starting to isolate yourself from others.    Symptoms of depression are starting to last most all day and this has happened for several days.     You may have suicidal thoughts but they are not constant.   What you need to do:     1. Call your care team. Your response to treatment will improve if you keep your care team informed of your progress. Yellow periods are signs an adjustment may need to be made.     2. Continue your self-care.  Just get dressed and ready for the day.  Don't give yourself time to talk yourself out of it.    3. Talk to someone in your support network.    4. Open up your Depression Self-Care Plan/Wellness Kit.           RED    ZONE Medical Alert - Get Help    What it looks like:     Depression is seriously interfering with your life.     You may experience these or other symptoms: You can t get out of bed most days, can t work or engage in other necessary activities, you have trouble taking care of basic hygiene, or basic responsibilities, thoughts of suicide or  death that will not go away, self-injurious behavior.     What you need to do:  1. Call your care team and request a same-day appointment. If they are not available (weekends or after hours) call your local crisis line, emergency room or 911.          Depression Self-Care Plan / Wellness Kit    Many people find that medication and therapy are helpful treatments for managing depression. In addition, making small changes to your everyday life can help to boost your mood and improve your wellbeing. Below are some tips for you to consider. Be sure to talk with your medical provider and/or behavioral health consultant if your symptoms are worsening or not improving.     Sleep   Sleep hygiene  means all of the habits that support good, restful sleep. It includes maintaining a consistent bedtime and wake time, using your bedroom only for sleeping or sex, and keeping the bedroom dark and free of distractions like a computer, smartphone, or television.     Develop a Healthy Routine  Maintain good hygiene. Get out of bed in the morning, make your bed, brush your teeth, take a shower, and get dressed. Don t spend too much time viewing media that makes you feel stressed. Find time to relax each day.    Exercise  Get some form of exercise every day. This will help reduce pain and release endorphins, the  feel good  chemicals in your brain. It can be as simple as just going for a walk or doing some gardening, anything that will get you moving.      Diet  Strive to eat healthy foods, including fruits and vegetables. Drink plenty of water. Avoid excessive sugar, caffeine, alcohol, and other mood-altering substances.     Stay Connected with Others  Stay in touch with friends and family members.    Manage Your Mood  Try deep breathing, massage therapy, biofeedback, or meditation. Take part in fun activities when you can. Try to find something to smile about each day.     Psychotherapy  Be open to working with a therapist if your  provider recommends it.     Medication  Be sure to take your medication as prescribed. Most anti-depressants need to be taken every day. It usually takes several weeks for medications to work. Not all medicines work for all people. It is important to follow-up with your provider to make sure you have a treatment plan that is working for you. Do not stop your medication abruptly without first discussing it with your provider.    Crisis Resources   These hotlines are for both adults and children. They and are open 24 hours a day, 7 days a week unless noted otherwise.      National Suicide Prevention Lifeline   8-428-866-ZSBN (9870)      Crisis Text Line    www.crisistextline.org  Text HOME to 760687 from anywhere in the [place], anytime, about any type of crisis. A live, trained crisis counselor will receive the text and respond quickly.      [facility] Lifeline for LGBTQ Youth  A national crisis intervention and suicide lifeline for LGBTQ youth under 25. Provides a safe place to talk without judgement. Call 1-220.689.6277; text START to 662961 or visit www.thetrevorproject.org to talk to a trained counselor.      For [facility] crisis numbers, visit the [facility] website at:  https://[place].gov/dhs/people-we-serve/adults/health-care/mental-health/resources/crisis-contacts.jsp

## 2022-07-18 NOTE — PROGRESS NOTES
"  Assessment & Plan     (K44.9) Hiatal hernia  (primary encounter diagnosis)  Comment: patient would like a referral to GI   Plan: Adult GI  Referral - Consult Only,         omeprazole (PRILOSEC) 20 MG DR capsule      (R10.31) Inguinal pain, right  Comment: no serious findings on CT.  no obvious hernia seen on CT, but I suspect one is present of this is a inguinal nerve that also cause this. continue high fiber and fluids.  Plan:     (R10.9) Flank pain  Comment: no sign of ureteral stone.  could be lat dorsi muscle or similar  Plan:     (R71.8) Microcytic red blood cells  Comment: these are also pale - I am suspicious that you are iron deficient and have sent labs to check on this.  however, if this is present we should determine where you are losing blood  Plan: CBC with platelets          No follow-ups on file.    AUDELIA Medellin CNP  [facility]    Feliciano GUEVARA is a 27 year old, presenting for the following health issues:  No chief complaint on file.      [facility]     ED/UC Followup:    Facility:  [facility]  Date of visit: 7/7/22  Reason for visit: inguinal pain  Current Status: Patient states that his pain is doing better. He needs a referral for his hiatal hernia.           Review of Systems   Constitutional, HEENT, cardiovascular, pulmonary, gi and gu systems are negative, except as otherwise noted.      Objective    /70 (BP Location: Right arm, Cuff Size: Adult Large)   Pulse 80   Temp 97.3  F (36.3  C) (Tympanic)   Resp 18   Ht 1.803 m (5' 11\")   Wt 113.4 kg (250 lb)   BMI 34.87 kg/m    There is no height or weight on file to calculate BMI.  Physical Exam   GENERAL: healthy, alert and no distress  EYES: Eyes grossly normal to inspection, PERRL and conjunctivae and sclerae normal  HENT: ear canals and TM's normal, nose and mouth without ulcers or lesions  NECK: no adenopathy, no asymmetry, masses, or scars and thyroid " normal to palpation  RESP: lungs clear to auscultation - no rales, rhonchi or wheezes  CV: regular rate and rhythm, normal S1 S2, no S3 or S4, no murmur, click or rub, no peripheral edema and peripheral pulses strong  ABDOMEN: soft, nontender, no hepatosplenomegaly, no masses and bowel sounds normal  MS: no gross musculoskeletal defects noted, no edema  SKIN: no suspicious lesions or rashes  NEURO: Normal strength and tone, mentation intact and speech normal  PSYCH: mentation appears normal, affect normal/bright    Results for orders placed or performed in visit on 07/18/22   CBC with platelets     Status: Normal   Result Value Ref Range    WBC Count 6.3 4.0 - 11.0 10e3/uL    RBC Count 5.43 4.40 - 5.90 10e6/uL    Hemoglobin 14.4 13.3 - 17.7 g/dL    Hematocrit 42.3 40.0 - 53.0 %    MCV 78 78 - 100 fL    MCH 26.5 26.5 - 33.0 pg    MCHC 34.0 31.5 - 36.5 g/dL    RDW 12.2 10.0 - 15.0 %    Platelet Count 237 150 - 450 10e3/uL

## 2022-07-21 NOTE — RESULT ENCOUNTER NOTE
Please Notify Savage  of test results cbc is within normal limits with normal hemoglobin levels.     Leena Fernandez CNP

## 2022-08-26 ENCOUNTER — HOSPITAL ENCOUNTER (OUTPATIENT)
Dept: CARDIOLOGY | Facility: CLINIC | Age: 27
Discharge: HOME | End: 2022-08-26
Attending: INTERNAL MEDICINE | Admitting: INTERNAL MEDICINE
Payer: COMMERCIAL

## 2022-08-26 DIAGNOSIS — I49.3 PVC'S (PREMATURE VENTRICULAR CONTRACTIONS): ICD-10-CM

## 2022-08-26 DIAGNOSIS — R07.89 ATYPICAL CHEST PAIN: ICD-10-CM

## 2022-08-26 PROCEDURE — 93321 DOPPLER ECHO F-UP/LMTD STD: CPT | Mod: 26 | Performed by: INTERNAL MEDICINE

## 2022-08-26 PROCEDURE — 93350 STRESS TTE ONLY: CPT | Mod: 26 | Performed by: INTERNAL MEDICINE

## 2022-08-26 PROCEDURE — 93321 DOPPLER ECHO F-UP/LMTD STD: CPT | Mod: TC

## 2022-08-26 PROCEDURE — 93018 CV STRESS TEST I&R ONLY: CPT | Performed by: INTERNAL MEDICINE

## 2022-08-26 PROCEDURE — 93325 DOPPLER ECHO COLOR FLOW MAPG: CPT | Mod: 26 | Performed by: INTERNAL MEDICINE

## 2022-08-26 PROCEDURE — 93016 CV STRESS TEST SUPVJ ONLY: CPT | Performed by: INTERNAL MEDICINE

## 2022-08-26 PROCEDURE — 255N000002 HC RX 255 OP 636: Performed by: INTERNAL MEDICINE

## 2022-08-26 PROCEDURE — C8928 TTE W OR W/O FOL W/CON,STRES: HCPCS

## 2022-08-26 RX ADMIN — HUMAN ALBUMIN MICROSPHERES AND PERFLUTREN 2 ML: 10; .22 INJECTION, SOLUTION INTRAVENOUS at 10:21

## 2022-08-26 NOTE — PROGRESS NOTES
Patient here for a Stress Echo test today.  Exercised without any complaints of chest pain.   Early in recovery, complaining of sharp pulsating chest pain 6/10.  Also reported at that time that he was having an ache in his chest 2/10 on arrival, that he usually has this pain all of the time, part of the reason why he is having the test.  The sharp pulsating pain was different, but resolved by 7 minutes recovery, when discomfort returned to chest ache 2/10.  Dr Han notified and patient ok to go home.

## 2022-08-29 ENCOUNTER — TELEPHONE (OUTPATIENT)
Dept: CARDIOLOGY | Facility: CLINIC | Age: 27
End: 2022-08-29

## 2022-08-29 ENCOUNTER — TRANSCRIBE ORDERS (OUTPATIENT)
Dept: CARDIOLOGY | Facility: CLINIC | Age: 27
End: 2022-08-29

## 2022-08-29 DIAGNOSIS — R07.9 CHEST PAIN: Primary | ICD-10-CM

## 2022-08-29 NOTE — TELEPHONE ENCOUNTER
"Routing call to Dr. Norberto Thornton last saw pt 7/8/22 per clinic note - \"June 9 he was at the dentist and underwent sedation with Versed and fentanyl for tooth extraction.  Apparently PVCs were noted on the monitor, he did not have his teeth pulled.  He was directed to the ED.  He was found to have some PVCs on telemetry, but was in sinus rhythm and otherwise had normal work-up. PVCs with atypical chest pain  -Treadmill stress echo.\"    Stress echo done 8/26/22- \"Interpretation Summary  This was a normal stress echocardiogram with no evidence of stress-induced  ischemia. The patient did complain of 6/10 chest discomfort immediately post  Exercise.\"    Okay for cardiac clearance for anesthesia for dental work?    Cathernie Casanova RN    "

## 2022-08-29 NOTE — LETTER
[facility]  [address] [place]  [address]  [phone]      2022    RE :Savage Roberts  [id number]  [facility] [address]  [phone] (home)     : 1995        To Whom it May Concern:    Stephen Roberts was seen at [facility] clinic and was cleared for all dental procedures from a cardiac standpoint.     Please contact our office with any further questions or concerns.    Sincerely,    Dr. Jovani Casanova RN

## 2022-08-29 NOTE — TELEPHONE ENCOUNTER
[facility] Call Center    Phone Message    May a detailed message be left on voicemail: yes     Reason for Call: Other:     Stephen is requesting clearance for anesthesia for dental work.  Please call to discuss or send clearance through My Chart.    Action Taken: Other: cardio    Travel Screening: Not Applicable

## 2022-08-29 NOTE — TELEPHONE ENCOUNTER
Attempted to reach pt to give a verbal okay for dental clearance, no answer. Left message asking pt to call back to let us know if we need to call the dental clinic to give a verbal okay or if he needs a letter from us.     Will await a return call.     Catherine Casanova RN

## 2022-08-29 NOTE — TELEPHONE ENCOUNTER
Yes, OK for dental work.    Jovani Thornton MD  Cardiology - [facility]  Pager: [phone]  Text Page  August 29, 2022

## 2022-08-29 NOTE — RESULT ENCOUNTER NOTE
No evidence stress induced ischemia; did have 6/10 chest pain immediately post exercise. Will discuss with Dr Thornton re: follow up plan.

## 2022-08-30 NOTE — PROGRESS NOTES
Does Savage have a CPAP/Bipap?  No     Portland Sleep Scale: 14  BMI:34.87(07/18/22)                Outpatient Sleep Medicine Consultation:      Name: Savage Roberts MRN# [id number]   Age: 27 year old YOB: 1995     Date of Consultation: September 1, 2022  Consultation is requested by: AUDELIA Xiong CNP  [address] [place] [address] Teodora Martinez  Primary care provider: No Ref-Primary, Physician       Reason for Sleep Consult:     Savage Roberts is sent by Teodora Martinez for a sleep consultation regarding mainly excessive daytime sleepiness with Portland score 14.  He was told that he had obstructive sleep apnea as a child.  He had a sleep study done at [facility] but at that time it looks like he had a central AHI of 1.4 and obstructive 1 total of 2.4.  He was given CPAP but never used it.  He has had excessive daytime sleepiness even then.  He is complaint is witnessed apneas gasping for air often waking up questionably snoring as reported comes from some friends but not from his girlfriend.  Primarily family history of narcolepsy his mother.  Also he complains of deep dreams possibly hypnagogic dreams.  No sleep paralysis questionable cataplexy.  There is some almost syncopal episodes but cardiac work-up just showed some ventricular arrhythmia PVCs nothing else..  Patient also works night shift.  He is night owl personality wise.  Patient s Reason for visit  Savage Roberts main reason for visit: I have done a sleep study before and need a cpap, my brain send signals to my lungs to stop breathing is what I was told before  Patient states problem(s) started: As long as I can remember  Savage Roberts's goals for this visit: Getting a sleep study done/cpap.           Assessment and Plan:     Summary Sleep Diagnoses:  Excessive daytime sleepiness possible obstructive sleep apnea possible narcolepsy  Possible shiftwork disorder.  Comorbid  Diagnoses:  Ventricular arrhythmia      Summary Recommendations:  Further evaluation with the sleep diary, actigraphy, comprehensive sleep study and if he qualifies to follow with MSLT  No orders of the defined types were placed in this encounter.        Summary Counseling:    Sleep Testing Reviewed  Obstructive Sleep Apnea Reviewed  Complications of Untreated Sleep Apnea Reviewed      Medical Decision-making:   Educational materials provided in instructions    Total time spent reviewing medical records, history and physical examination, review of previous testing and interpretation as well as documentation on this date: 40 minutes    CC: Teodora Martinez          History of Present Illness:     Past Sleep Evaluations:    SLEEP-WAKE SCHEDULE:     Work/School Days: Patient goes to school/work: Yes   Usually gets into bed at 4am  Takes patient about No time at all to fall asleep  Has trouble falling asleep O nights per week  Wakes up in the middle of the night 4-5 times.  Wakes up due to Pain;External stimuli (bed partner, pets, noise, etc);Other  He has trouble falling back asleep Every day times a week.   It usually takes 2+ hours to get back to sleep  Patient is usually up at 12pm  Uses alarm:      Weekends/Non-work Days/All Other Days:  Usually gets into bed at 4am   Takes patient about 10 minutes or so to fall asleep  Patient is usually up at 12pm  Uses alarm: Yes    Sleep Need  Patient gets  7-9 sleep on average   Patient thinks he needs about 5 sleep    Savage S Armando prefers to sleep in this position(s): Back;Side;Stomach   Patient states they do the following activities in bed: Other    Naps  Patient takes a purposeful nap O times a week and naps are usually 15 in duration  He feels better after a nap: No  He dozes off unintentionally O days per week  Patient has had a driving accident or near-miss due to sleepiness/drowsiness: No      SLEEP DISRUPTIONS:    Breathing/Snoring  Patient snores:No  Other  people complain about his snoring: No by his girlfriend but others mentioned snoring.  Patient has been told he stops breathing in his sleep:Yes  He has issues with the following: Morning headaches;Morning mouth dryness;Stuffy nose when you wake up;Heartburn or reflux at night    Movement:  Patient gets pain, discomfort, with an urge to move:  Yes  It happens when he is resting:  Yes  It happens more at night:  Yes  Patient has been told he kicks his legs at night:  No     Behaviors in Sleep:  Savage Roberts has experienced the following behaviors while sleeping: Sleep-talking;Sleepwalking;Teeth grinding all those behaviors stopped as a teen.  He has experienced sudden muscle weakness during the day:    More of a soreness in his body not so much weakness.  Present hypnagogic dreams   No sleep paralysis  Is there anything else you would like your sleep provider to know:      Not refreshed after naps. Has some sleep inertia.    CAFFEINE AND OTHER SUBSTANCES:    Patient consumes caffeinated beverages per day:  1-2  Last caffeine use is usually: 9pm  List of any prescribed or over the counter stimulants that patient takes:    List of any prescribed or over the counter sleep medication patient takes:    List of previous sleep medications that patient has tried:    Patient drinks alcohol to help them sleep: No  Patient drinks alcohol near bedtime: No    Family History: Mother has Narcolepsy, patient has had syncopal episodes, no documented seizure but no recollection of experience.  Patient has a family member been diagnosed with a sleep disorder: No            SCALES:    EPWORTH SLEEPINESS SCALE      [place] Sleepiness Scale ( TUNG Chappell  [id number]<br>ESS - [place]/English - Final version - 21 Nov 07 - [facility] [place].) 9/1/2022   Sitting and reading High chance of dozing   Watching TV Moderate chance of dozing   Sitting, inactive in a public place (e.g. a theatre or a meeting) Moderate chance of dozing   As a  passenger in a car for an hour without a break High chance of dozing   Lying down to rest in the afternoon when circumstances permit Moderate chance of dozing   Sitting and talking to someone Would never doze   Sitting quietly after a lunch without alcohol Moderate chance of dozing   In a car, while stopped for a few minutes in traffic Would never doze   Minerva Score (MC) 14   Minerva Score (Sleep) 14         INSOMNIA SEVERITY INDEX (DANIELA)      Insomnia Severity Index (DANIELA) 9/1/2022   Difficulty falling asleep 0   Difficulty staying asleep 3   Problems waking up too early 3   How SATISFIED/DISSATISFIED are you with your CURRENT sleep pattern? 4   How NOTICEABLE to others do you think your sleep problem is in terms of impairing the quality of your life? 4   How WORRIED/DISTRESSED are you about your current sleep problem? 1   To what extent do you consider your sleep problem to INTERFERE with your daily functioning (e.g. daytime fatigue, mood, ability to function at work/daily chores, concentration, memory, mood, etc.) CURRENTLY? 2   DANIELA Total Score 17       Guidelines for Scoring/Interpretation:  Total score categories:  0-7 = No clinically significant insomnia   8-14 = Subthreshold insomnia   15-21 = Clinical insomnia (moderate severity)  22-28 = Clinical insomnia (severe)  Used via courtesy of www.Contextorsth.va.gov with permission from Aleksandar Phipps PhD., [facility]      STOP BANG     STOP BANG Questionnaire (  2008, the American Society of Anesthesiologists, Inc. [employer] 9/1/2022   1. Snoring - Do you snore loudly (louder than talking or loud enough to be heard through closed doors)? No   2. Tired - Do you often feel tired, fatigued, or sleepy during daytime? Yes   3. Observed - Has anyone observed you stop breathing during your sleep? Yes   4. Blood pressure - Do you have or are you being treated for high blood pressure? No   5. BMI - BMI more than 35 kg/m2? No   6. Age - Age  "over 50 yr old? No   7. Neck circumference - Neck circumference greater than 40 cm? No   8. Gender - Gender male? Yes   STOP BANG Score (MC): 2 (Low risk of KEIKO)   B/P Clinic: -   BMI Clinic: -         GAD7    No flowsheet data found.      CAGE-AID    No flowsheet data found.    CAGE-AID reprinted with permission from the [place] Medical Journal, SHAR Hull. and FEDE Renee, \"Conjoint screening questionnaires for alcohol and drug abuse\" [place] Medical Journal 94: 135-140, 1995.      PATIENT HEALTH QUESTIONNAIRE-9 (PHQ - 9)    No flowsheet data found.    Developed by Jossy Givens, Marisol Thornton, Julio Raymond and colleagues, with an educational ridge from [employer] No permission required to reproduce, translate, display or distribute.        Allergies:    Allergies   Allergen Reactions     Dust Mites      Ipratropium Other (See Comments)     raw nose      crusted     bleeding     led to sinus infection      2/2014        Medications:    Current Outpatient Medications   Medication Sig Dispense Refill     allopurinol (ZYLOPRIM) 100 MG tablet Take 1 tablet (100 mg) by mouth daily 90 tablet 3     omeprazole (PRILOSEC) 20 MG DR capsule Take 1 capsule (20 mg) by mouth daily 90 capsule 3       Problem List:  Patient Active Problem List    Diagnosis Date Noted     Left knee pain, unspecified chronicity 05/07/2021     Priority: Medium     Microcytosis 10/10/2019     Priority: Medium     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     Priority: Medium        Past Medical/Surgical History:  Past Medical History:   Diagnosis Date     Anxiety disorder      Autistic disorder      Bipolar affective disorder (H)      Duane syndrome      Gastro-oesophageal reflux disease      OCD (obsessive compulsive disorder)      PTSD (post-traumatic stress disorder)      Strabismus      Past Surgical History:   Procedure Laterality Date     NISSEN FUNDOPLICATION  1/2010     RECESSION RESECTION (REPAIR STRABISMUS) BILATERAL Bilateral " 1/20/2015    Procedure: RECESSION RESECTION (REPAIR STRABISMUS) BILATERAL;  Surgeon: Shaun Storey MD;  Location: [facility] OR     SEPTOPLASTY  12/2011     STRABISMUS SURGERY  6/27/13    RLRc 5 and LLRc 4mm (Daniel)       Social History:  Social History     Socioeconomic History     Marital status: Single     Spouse name: Not on file     Number of children: Not on file     Years of education: Not on file     Highest education level: Not on file   Occupational History     Not on file   Tobacco Use     Smoking status: Never Smoker     Smokeless tobacco: Never Used   Vaping Use     Vaping Use: Never used   Substance and Sexual Activity     Alcohol use: No     Drug use: No     Sexual activity: Yes     Partners: Female   Other Topics Concern     Parent/sibling w/ CABG, MI or angioplasty before 65F 55M? Not Asked   Social History Narrative     Not on file     Social Determinants of Health     Financial Resource Strain: Not on file   Food Insecurity: Not on file   Transportation Needs: Not on file   Physical Activity: Not on file   Stress: Not on file   Social Connections: Not on file   Intimate Partner Violence: Not on file   Housing Stability: Not on file       Family History:  Family History   Problem Relation Age of Onset     Heart Disease Mother         paracarditis     Neurologic Disorder Mother         nystagmus, bipolar, narcolepsy     Arthritis Mother      Heart Disease Maternal Grandmother         microvalve prolapse     Strabismus Maternal Grandmother         no sx, no glasses, no patching     Myocardial Infarction Maternal Grandmother      Multiple Sclerosis Maternal Grandfather      Myocardial Infarction Maternal Uncle      Multiple Sclerosis Maternal Uncle      Strabismus Other         strab sx, glasses/strab sx       Review of Systems:  A complete review of systems reviewed by me is negative with the exeption of what has been mentioned in the history of present illness.  In the last TWO WEEKS have you experienced  "any of the following symptoms?  Fevers: No  Night Sweats: Yes  Weight Gain: No  Pain at Night: Yes  Double Vision: Yes  Changes in Vision: No  Difficulty Breathing through Nose: Yes  Sore Throat in Morning: Yes  Dry Mouth in the Morning: Yes  Shortness of Breath Lying Flat: Yes  Shortness of Breath With Activity: No  Awakening with Shortness of Breath: No  Increased Cough: Yes  Heart Racing at Night: No  Swelling in Feet or Legs: No  Diarrhea at Night: No  Heartburn at Night: No  Urinating More than Once at Night: No  Losing Control of Urine at Night: No  Joint Pains at Night: No  Headaches in Morning: Yes  Weakness in Arms or Legs: Yes  Depressed Mood: No  Anxiety: No     Physical Examination:  Vitals: Ht 1.803 m (5' 11\")   BMI 34.87 kg/m    BMI= Body mass index is 34.87 kg/m .           GENERAL APPEARANCE: healthy, alert, no distress and smiling  EYES: Eyes grossly normal to inspection, PERRL and conjunctivae and sclerae normal  HENT: ear canals and TM's normal, nose and mouth without ulcers or lesions, oropharynx crowded and soft palate dependent  NECK: no adenopathy, no asymmetry, masses, or scars and thyroid normal to palpation  NEURO: Normal strength and tone, mentation intact and speech normal  PSYCH: mentation appears normal and affect normal/bright  Mallampati Class: II.  Tonsillar Stage: 2  visible at pillars.         Data: All pertinent previous laboratory data reviewed     Recent Labs   Lab Test 07/07/22  1603 06/09/22  1425    141   POTASSIUM 4.2 4.7   CHLORIDE 105 110*   CO2 29 27   ANIONGAP 2* 4   GLC 90 103*   BUN 11 16   CR 0.90 0.97   CLAUDIA 9.0 9.3       Recent Labs   Lab Test 07/18/22  1641   WBC 6.3   RBC 5.43   HGB 14.4   HCT 42.3   MCV 78   MCH 26.5   MCHC 34.0   RDW 12.2          Recent Labs   Lab Test 07/07/22  1603   PROTTOTAL 7.5   ALBUMIN 3.9   BILITOTAL 0.6   ALKPHOS 68   AST 36   ALT 58       TSH (mU/L)   Date Value   09/10/2019 2.25   04/02/2008 4.01       Amphetamine Qual " Urine (no units)   Date Value   03/18/2008 Negative     Barbiturates Qual Urine (no units)   Date Value   03/18/2008 Negative     Benzodiazepine Qual Urine (no units)   Date Value   03/18/2008 Negative     Cannabinoids Qual Urine (no units)   Date Value   03/18/2008 Negative     Cocaine Qual Urine (no units)   Date Value   03/18/2008 Negative     Opiates Qualitative Urine (no units)   Date Value   03/18/2008 Negative     PCP Qual Urine (no units)   Date Value   03/18/2008 Negative       Iron Saturation Index   Date/Time Value Ref Range Status   09/11/2019 03:39 PM 38 15 - 46 % Final     Iron Sat Index   Date/Time Value Ref Range Status   07/07/2022 04:03 PM 31 15 - 46 % Final     Ferritin   Date/Time Value Ref Range Status   07/07/2022 04:03  26 - 388 ng/mL Final   09/11/2019 03:39 PM 98 26 - 388 ng/mL Final       No results found for: PH, PHARTERIAL, PO2, [id number], SAT, PCO2, HCO3, BASEEXCESS, GINGER, BEB    @LABRCNTIPR(phv:4,pco2v:4,po2v:4,hco3v:4,laura:4,o2per:4)@    Echocardiology: No results found for this or any previous visit (from the past 4320 hour(s)).    Chest x-ray: XR Chest 2 Views 06/09/2022    Narrative  XR CHEST 2 VW 6/9/2022 3:28 PM    HISTORY: Cough, short of air    COMPARISON: 9/18/2018.    FINDINGS: Clear lungs. Normal heart size and pulmonary vasculature.  Unchanged plate and screw fixation left clavicle.    Impression  IMPRESSION: Negative chest.    JUAN MCCRAY MD      SYSTEM ID:  [id number]      Chest CT: No results found for this or any previous visit from the past 365 days.      PFT: Most Recent B[facility]ze Pulmonary Function Testing    No results found for: 20001  No results found for: 20002  No results found for: 20003  No results found for: 20015  No results found for: 20016  No results found for: 20027  No results found for: 20028  No results found for: 20029  No results found for: 20079  No results found for: 20080  No results found for: 20081  No results found for: 20335  No results  found for: 20105  No results found for: 20053  No results found for: 20054  No results found for: 20055      Angel Luis Corey CMA 9/1/2022

## 2022-09-01 ENCOUNTER — OFFICE VISIT (OUTPATIENT)
Dept: SLEEP MEDICINE | Facility: CLINIC | Age: 27
End: 2022-09-01
Attending: NURSE PRACTITIONER
Payer: COMMERCIAL

## 2022-09-01 VITALS
OXYGEN SATURATION: 97 % | HEART RATE: 86 BPM | HEIGHT: 71 IN | DIASTOLIC BLOOD PRESSURE: 62 MMHG | WEIGHT: 247 LBS | BODY MASS INDEX: 34.58 KG/M2 | SYSTOLIC BLOOD PRESSURE: 122 MMHG

## 2022-09-01 DIAGNOSIS — R06.81 APNEA: ICD-10-CM

## 2022-09-01 DIAGNOSIS — R06.83 SNORING: ICD-10-CM

## 2022-09-01 DIAGNOSIS — G47.19 EXCESSIVE DAYTIME SLEEPINESS: Primary | ICD-10-CM

## 2022-09-01 DIAGNOSIS — G47.26 SHIFT WORK SLEEP DISORDER: ICD-10-CM

## 2022-09-01 DIAGNOSIS — G47.411 NARCOLEPSY AND CATAPLEXY: ICD-10-CM

## 2022-09-01 DIAGNOSIS — G47.39 OTHER SLEEP APNEA: ICD-10-CM

## 2022-09-01 PROCEDURE — 99204 OFFICE O/P NEW MOD 45 MIN: CPT | Performed by: OTOLARYNGOLOGY

## 2022-09-01 ASSESSMENT — SLEEP AND FATIGUE QUESTIONNAIRES
HOW LIKELY ARE YOU TO NOD OFF OR FALL ASLEEP IN A CAR, WHILE STOPPED FOR A FEW MINUTES IN TRAFFIC: WOULD NEVER DOZE
HOW LIKELY ARE YOU TO NOD OFF OR FALL ASLEEP WHILE SITTING QUIETLY AFTER LUNCH WITHOUT ALCOHOL: MODERATE CHANCE OF DOZING
HOW LIKELY ARE YOU TO NOD OFF OR FALL ASLEEP WHILE SITTING AND READING: HIGH CHANCE OF DOZING
HOW LIKELY ARE YOU TO NOD OFF OR FALL ASLEEP WHILE SITTING INACTIVE IN A PUBLIC PLACE: MODERATE CHANCE OF DOZING
HOW LIKELY ARE YOU TO NOD OFF OR FALL ASLEEP WHILE LYING DOWN TO REST IN THE AFTERNOON WHEN CIRCUMSTANCES PERMIT: MODERATE CHANCE OF DOZING
HOW LIKELY ARE YOU TO NOD OFF OR FALL ASLEEP WHILE WATCHING TV: MODERATE CHANCE OF DOZING
HOW LIKELY ARE YOU TO NOD OFF OR FALL ASLEEP WHILE SITTING AND TALKING TO SOMEONE: WOULD NEVER DOZE
HOW LIKELY ARE YOU TO NOD OFF OR FALL ASLEEP WHEN YOU ARE A PASSENGER IN A CAR FOR AN HOUR WITHOUT A BREAK: HIGH CHANCE OF DOZING

## 2022-09-19 ENCOUNTER — TELEPHONE (OUTPATIENT)
Dept: SLEEP MEDICINE | Facility: CLINIC | Age: 27
End: 2022-09-19

## 2022-09-19 NOTE — TELEPHONE ENCOUNTER
LVM for pt to call back to schedule, actigraphy p/u, PSG, MSLT and F/U.     KIAN Bolaños, Clinical Specialist - Susan Oneill

## 2022-10-05 ENCOUNTER — TELEPHONE (OUTPATIENT)
Dept: SLEEP MEDICINE | Facility: CLINIC | Age: 27
End: 2022-10-05

## 2022-11-20 ENCOUNTER — HEALTH MAINTENANCE LETTER (OUTPATIENT)
Age: 27
End: 2022-11-20

## 2022-12-08 ENCOUNTER — TELEPHONE (OUTPATIENT)
Dept: FAMILY MEDICINE | Facility: CLINIC | Age: 27
End: 2022-12-08

## 2022-12-08 NOTE — TELEPHONE ENCOUNTER
Pt has been sick since Monday, body aches and chills, then vomiting, now has sinus congestion, eye discharge and sweats. Pt gets SOA after 20 minutes of activity. Pt is eating and drinking okay. He was advised to continue home care, he will go to urgent care with any concerns.  Kimberly Espinoza RN

## 2023-01-03 NOTE — TELEPHONE ENCOUNTER
REFERRAL INFORMATION:    Referring Provider:  Jim    Referring Clinic:  Internal    Reason for Visit/Diagnosis:  Hiatal hernia      FUTURE VISIT INFORMATION:    Appointment Date: 2/13/2023     NOTES STATUS DETAILS   OFFICE NOTE from Referring Provider Internal 7/18/2022 Office visit with Jim   OFFICE NOTE from Other Specialist Internal 7/13/2022 SURG visit with [facility] DISCHARGE SUMMARY/  ED VISITS Internal 7/7/2022 [place] ED   OPERATIVE REPORT N/A    MEDICATION LIST Internal         ENDOSCOPY  N/A    COLONOSCOPY N/A    ERCP N/A    EUS N/A    STOOL TESTING N/A    PERTINENT LABS N/A    PATHOLOGY REPORTS (RELATED) N/A    IMAGING (CT, MRI, EGD, MRCP, Small Bowel Follow Through/SBT, MR/CT Enterography) Internal CT:   7/7/2022 Abdomen Pelvis

## 2023-01-09 ENCOUNTER — APPOINTMENT (OUTPATIENT)
Dept: SLEEP MEDICINE | Facility: CLINIC | Age: 28
End: 2023-01-09
Payer: COMMERCIAL

## 2023-01-22 ENCOUNTER — THERAPY VISIT (OUTPATIENT)
Dept: SLEEP MEDICINE | Facility: CLINIC | Age: 28
End: 2023-01-22
Payer: COMMERCIAL

## 2023-01-22 DIAGNOSIS — G47.411 NARCOLEPSY AND CATAPLEXY: ICD-10-CM

## 2023-01-22 DIAGNOSIS — G47.19 EXCESSIVE DAYTIME SLEEPINESS: ICD-10-CM

## 2023-01-22 DIAGNOSIS — G47.26 SHIFT WORK SLEEP DISORDER: ICD-10-CM

## 2023-01-22 DIAGNOSIS — R06.83 SNORING: ICD-10-CM

## 2023-01-22 DIAGNOSIS — R06.81 APNEA: ICD-10-CM

## 2023-01-22 PROCEDURE — 95810 POLYSOM 6/> YRS 4/> PARAM: CPT | Performed by: OTOLARYNGOLOGY

## 2023-01-23 ENCOUNTER — APPOINTMENT (OUTPATIENT)
Dept: SLEEP MEDICINE | Facility: CLINIC | Age: 28
End: 2023-01-23
Payer: COMMERCIAL

## 2023-01-23 DIAGNOSIS — G47.411 NARCOLEPSY AND CATAPLEXY: Primary | ICD-10-CM

## 2023-02-01 LAB — SLPCOMP: NORMAL

## 2023-02-13 ENCOUNTER — VIRTUAL VISIT (OUTPATIENT)
Dept: GASTROENTEROLOGY | Facility: CLINIC | Age: 28
End: 2023-02-13
Attending: NURSE PRACTITIONER
Payer: COMMERCIAL

## 2023-02-13 ENCOUNTER — PRE VISIT (OUTPATIENT)
Dept: GASTROENTEROLOGY | Facility: CLINIC | Age: 28
End: 2023-02-13

## 2023-02-13 DIAGNOSIS — R19.7 DIARRHEA, UNSPECIFIED TYPE: Primary | ICD-10-CM

## 2023-02-13 DIAGNOSIS — K21.9 GASTROESOPHAGEAL REFLUX DISEASE, UNSPECIFIED WHETHER ESOPHAGITIS PRESENT: ICD-10-CM

## 2023-02-13 DIAGNOSIS — K44.9 HIATAL HERNIA: ICD-10-CM

## 2023-02-13 DIAGNOSIS — K92.1 BLACK STOOLS: ICD-10-CM

## 2023-02-13 PROCEDURE — 99205 OFFICE O/P NEW HI 60 MIN: CPT | Mod: VID | Performed by: PHYSICIAN ASSISTANT

## 2023-02-13 ASSESSMENT — PAIN SCALES - GENERAL: PAINLEVEL: NO PAIN (0)

## 2023-02-13 NOTE — NURSING NOTE
Is the patient currently in the state of [place]? YES    Visit mode:VIDEO    If the visit is dropped, the patient can be reconnected by: VIDEO VISIT: Text to cell phone: [phone]    Will anyone else be joining the visit? NO      How would you like to obtain your AVS? MyChart    Are changes needed to the allergy or medication list? NO    Comments or concerns regarding today's visit: new GI

## 2023-02-13 NOTE — PROGRESS NOTES
Video-Visit Details    Type of service:  Video Visit    Video Start Time (time video started): 12:53 PM    Video End Time (time video stopped): 1:32 PM    Originating Location (pt. Location): Home    Distant Location (provider location):  Off-site    Mode of Communication:  Video Conference via [facility]

## 2023-02-13 NOTE — PATIENT INSTRUCTIONS
It was a pleasure taking care of you today.  I've included a brief summary of our discussion and care plan from today's visit below.  Please review this information with your primary care provider.  ______________________________________________________________________    My recommendations are summarized as follows:    --please schedule an upper endoscopy (EGD).   --please obtain an x-ray of your abdomen to evaluate your stool burden.   --please obtain labs and stool studies -- you can go to any [facility] lab, you will need to call to schedule the lab visit as well as the x-ray of your abdomen.   -- Recommend starting supplementation with a powdered soluble fiber. When used on a daily basis, this can help regulate the consistency of your stools. Metamucil (psyllium) and Citrucel are preferred examples. You can start with 1-2 teaspoons per day, with goal to gradually increase to 1 tablespoon daily. You can increase up to 1 tablespoon three times daily if needed. It is important to stay well-hydrated with use of fiber supplementation and to make sure that the fiber powder is well mixed with water as directed on the label. You may experience some bloating with initiation of fiber, which will improve over the first few weeks. A good trial to evaluate the effect is 3-6 months. Of note, many of the fiber products contain artificial sweeteners, which can cause bloating, gas, and diarrhea in those who may be sensitive to artificial sweeteners. If this is the case, recommend trying Metamucil Premium Blend (with Stevia), Metamucil 4-in-1 without Added Sweeteners, or Bellway (sweetened with Monk fruit extract).    --please increase omeprazole to 40mg daily, 30-60 min before your first meal in the morning.     Lifestyle modifications for gastroesophageal reflux disease (GERD).     1. Change your eating habits.  -- It's best to eat several small meals instead of two or three large meals.  -- After you eat, wait 2 to 3  hours before you lie down. Late-night snacks aren't a good idea.  -- Chocolate, mint, and alcohol can make GERD worse. They relax the valve between the esophagus and the stomach.  -- Spicy foods, fatty foods, foods that have a lot of acid (like tomatoes and oranges), and coffee can make GERD symptoms worse in some people. If your symptoms are worse after you eat a certain food, you may want to stop eating that food to see if your symptoms get better.    2. Do not smoke or chew tobacco.    3. If you have GERD symptoms at night, raise the head of your bed 6 in. (15 cm) to 8 in. (20 cm) by putting the frame on blocks or placing a foam wedge under the head of your mattress. (Adding extra pillows does not work.)    4. Avoid or reduce pressure on your stomach. Don't wear tight clothing around your middle.    5. Lose weight if you need to. Losing just 5 to 10 pounds can help.      -- please see scheduling information provided below     Return to GI Clinic in 2 months to review your progress.    ______________________________________________________________________    How do I schedule labs, imaging studies, or procedures that were ordered in clinic today?     Labs: To schedule lab appointment at the [facility], use my chart or call [phone]. If you have a [facility] lab closer to home where you are regularly seen you can give them a call.     Procedures: If a colonoscopy, upper endoscopy, breath test, esophageal manometry, or pH impedence was ordered today, our endoscopy team will call you to schedule this. If you have not heard from our endoscopy team within a week, please call [phone] to schedule.     Imaging Studies: If you were scheduled for a CT scan, X-ray, MRI, ultrasound, HIDA scan or other imaging study, please call [phone] to have this scheduled.     Referral: If a referral to another specialty was ordered, expect a phone call or follow instructions  above. If you have not heard from anyone regarding your referral in a week, please call our clinic to check the status.     Who do I call with any questions after my visit?  Please be in touch if there are any further questions that arise following today's visit.  There are multiple ways to contact your gastroenterology care team.      During business hours, you may reach a Gastroenterology nurse at [phone]    To schedule or reschedule an appointment, please call [phone].     You can always send a secure message through Auxogyn.  Auxogyn messages are answered by your nurse or doctor typically within 24 hours.  Please allow extra time on weekends and holidays.      For urgent/emergent questions after business hours, you may reach the on-call GI Fellow by contacting the [facility]  at [phone].     How will I get the results of any tests ordered?    You will receive all of your results.  If you have signed up for Jootat, any tests ordered at your visit will be available to you after your provider reviews them.  Typically this takes 1-2 weeks.  If there are urgent results that require a change in your care plan, your provider or nurse will call you to discuss the next steps.      What is Auxogyn?  Auxogyn is a secure way for you to access all of your healthcare records from the [facility].  It is a web based computer program, so you can sign on to it from any location.  It also allows you to send secure messages to your care team.  I recommend signing up for Auxogyn access if you have not already done so and are comfortable with using a computer.      How to I schedule a follow-up visit?  If you did not schedule a follow-up visit today, please call [phone] to schedule a follow-up office visit.      Sincerely,    Robert Dubon PA-C  Division of Gastroenterology, Hepatology & Nutrition  [facility]

## 2023-02-13 NOTE — PROGRESS NOTES
I evaluated the patient with Robert Dubon PA-C and agree with assessment and plan as documented below.   Jose Alfredo Bahena PA-C  Division of Gastroenterology, Hepatology and Nutrition   [facility]       GI CLINIC VISIT    CC/REFERRING MD:  Leena Fernandez  REASON FOR CONSULTATION:  Hiatal Hernia, GERD, loose stools.    ASSESSMENT/PLAN:    26 y/o M presents for evaluation of a hiatal hernia, GERD symptoms, and loose stools.    #GERD, hx of Nissen Fundoplication/Hiatal Hernia  Patient reports a previous history of refractory GERD, and is s/p a Nissen Fundoplication which was done at age 16-17. He has noticed return of GERD symptoms approximately 2 years ago which he describes as heartburn.  He has started taking omeprazole 20 mg in July 2022 and has noticed some improvement with this although he does admits that he is not always compliant with this.  He has also tried to make some dietary modifications as well.  He also noted to me that he has noticed some black stools.  He denies any use of iron supplements or Pepto-Bismol.  At this time given his previous history and concerns for black stools, this raises suspicion for peptic ulcers, we will plan to obtain an EGD to evaluate further.  I recommended that he increase his omeprazole to 40 mg daily.  His hiatal hernia could definitely be contributing to his symptoms and it is possible that his Nissen may be loose.  -- Please schedule an upper endoscopy.  -- Increase omeprazole to 40 mg daily, 30 to 60 minutes before your first meal in the morning.  -- Reviewed GERD lifestyle modifications.    #Loose Stools  Patient reports that he has had episodes of watery and soft stools for the last year and a half.  He notes that generally he has 3 bowel movements daily that are generally soft in nature, but may even have up to 9-10 bowel movements that he describes as watery in nature.  He reports he has previously had issues with  constipation in the past, states this occurred when he was a child and he did trial supplemental soluble fiber at that time but feel it was not helpful.  He has tried to increase his dietary fiber.  Differential diagnosis includes but is not limited to: medication induced, overflow constipation, IBD -- crohn's, UC, microscopic colitis, etc, malabsorption, such as celiac disease, less likely bacterial in nature, etc. we will plan to obtain labs including stool studies and celiac serologies.  We will also obtain an x-ray of his abdomen to evaluate his stool burden.  Discussed the use of supplemental soluble fiber.  --obtain labs: cbc, cmp, esr, crp, tsh, fecal calprotectin, TTG, IgA  --obtain x-ray of abdomen to evaluate stool burden.   --start a supplemental soluble fiber.        RTC 2 months.    Thank you for this consultation.  It was a pleasure to participate in the care of this patient; please contact us with any further questions.       60  minutes spent on the date of the encounter doing chart review, patient visit and documentation    This note was created with voice recognition software, and while reviewed for accuracy, typos may remain.    Robert Dubon PA-C  Division of Gastroenterology, Hepatology and Nutrition  [facility]    26 y/o M presents for evaluation of a hiatal hernia, GERD symptoms, and loose stools.    Patient reports he has been experiencing symptoms of GERD -- states he has had this for some time, he had a nissen fundoplication at age 16-17. Symptoms resolved after this. He has noticed a rapid increase in his symptoms, which he describes as a burning sensation in his throat, which is present constantly. He states these symptoms began a little over 2 years ago, states symptoms came on gradually initially but now feels like symptoms are present constant. He reports associated symptoms including coughing when lying down. He has stopped  "drinking soda, and anything \"acidic\" for a week or two, which did help improve the symptoms very mildly. For the last 3-4 years , he will get some pain in the left upper or right shoulder, after eating too much.  He is taking omeprazole 20mg daily, generally before his first meal. May sometimes forget to take it on the weekends. He has not tried pepcid, tums, or gaviscon recently, states he take it as a child and didn't find it to be helpful.     Drinks alcohol occasionally. Denies tobacco use. Denies any illicit drug use. He does take ibuprofen, usually twice a week.     He reports bowel movements are fairly inconsistent, generally has 3 bowel movements a day -- he at times could have upwards of 9-10 bowel movements in a day.  He at times does see black in his stools. When he is having 9-10 BMs daily, states they are watery in nature. This occurs once a week. He states that otherwise stools may be soft or hard in nature. He states it has been a year and a half or so. He states prior to this he would have 1-2 bowel movements daily, typically solid.  He reports sensation of incomplete evacuation now. He reports 6 months ago, he noticed bright red blood in the stool, which he thought it was related to an anal fissure. He has had issues with constipation in the past. He does not take a soluble fiber, he tries to get more fiber through his diet.     No family hx of IBD or colon cancer.      ROS:    No fevers or chills  No weight loss  No chest pain.   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     Priority: Medium       PERTINENT PAST MEDICAL HISTORY:    Past Medical History:   Diagnosis Date     Anxiety disorder  "     Autistic disorder      Bipolar affective disorder (H)      Duane syndrome      Gastro-oesophageal reflux disease      OCD (obsessive compulsive disorder)      PTSD (post-traumatic stress disorder)      Strabismus        PREVIOUS SURGERIES:    Past Surgical History:   Procedure Laterality Date     NISSEN FUNDOPLICATION  1/2010     RECESSION RESECTION (REPAIR STRABISMUS) BILATERAL Bilateral 1/20/2015    Procedure: RECESSION RESECTION (REPAIR STRABISMUS) BILATERAL;  Surgeon: Shaun Storey MD;  Location: [facility] OR     SEPTOPLASTY  12/2011     STRABISMUS SURGERY  6/27/13    RLRc 5 and LLRc 4mm (Daniel)       PREVIOUS ENDOSCOPY:  Reviewed in [facility] -- last EGD done in 2012 and 2013, was otherwise normal. Did have Bravo testing in 2013, unable to find results of this.     ALLERGIES:     Allergies   Allergen Reactions     Dust Mites      Ipratropium Other (See Comments)     raw nose      crusted     bleeding     led to sinus infection      2/2014        PERTINENT MEDICATIONS:    Current Outpatient Medications:      allopurinol (ZYLOPRIM) 100 MG tablet, Take 1 tablet (100 mg) by mouth daily, Disp: 90 tablet, Rfl: 3     omeprazole (PRILOSEC) 20 MG DR capsule, Take 1 capsule (20 mg) by mouth daily, Disp: 90 capsule, Rfl: 3    SOCIAL HISTORY:   Social History     Socioeconomic History     Marital status: Single     Spouse name: Not on file     Number of children: Not on file     Years of education: Not on file     Highest education level: Not on file   Occupational History     Not on file   Tobacco Use     Smoking status: Never     Smokeless tobacco: Never   Vaping Use     Vaping Use: Never used   Substance and Sexual Activity     Alcohol use: No     Drug use: No     Sexual activity: Yes     Partners: Female   Other Topics Concern     Parent/sibling w/ CABG, MI or angioplasty before 65F 55M? Not Asked   Social History Narrative     Not on file     Social Determinants of Health     Financial Resource Strain: Not on  file   Food Insecurity: Not on file   Transportation Needs: Not on file   Physical Activity: Not on file   Stress: Not on file   Social Connections: Not on file   Intimate Partner Violence: Not on file   Housing Stability: Not on file       FAMILY HISTORY:  FH of CRC: None.  FH of IBD: None.  Family History   Problem Relation Age of Onset     Heart Disease Mother         paracarditis     Neurologic Disorder Mother         nystagmus, bipolar, narcolepsy     Arthritis Mother      Heart Disease Maternal Grandmother         microvalve prolapse     Strabismus Maternal Grandmother         no sx, no glasses, no patching     Myocardial Infarction Maternal Grandmother      Multiple Sclerosis Maternal Grandfather      Myocardial Infarction Maternal Uncle      Multiple Sclerosis Maternal Uncle      Strabismus Other         strab sx, glasses/strab sx       Past/family/social history reviewed and no changes    PHYSICAL EXAMINATION:  General: Patient appears well in no acute distress.   Skin: No visualized rash or lesions on visualized skin  Eyes: EOMI, no erythema, sclera icterus or discharge noted  Resp: Appears to be breathing comfortably without accessory muscle usage, speaking in full sentences, no cough  MSK: Appears to have normal range of motion based on visualized movements  Neurologic: No apparent tremors, facial movements symmetric  Psych: normal affect , alert and oriented            Video-Visit Details    Video Start Time: 12:53 PM    Type of service:  Video Visit    Video End Time:1:32 PM    Originating Location (pt. Location): Home        Distant Location (provider location):  Off-site    Platform used for Video Visit: Jhon

## 2023-02-15 ENCOUNTER — VIRTUAL VISIT (OUTPATIENT)
Dept: SLEEP MEDICINE | Facility: CLINIC | Age: 28
End: 2023-02-15
Payer: COMMERCIAL

## 2023-02-15 DIAGNOSIS — G47.10 HYPERSOMNIA: ICD-10-CM

## 2023-02-15 DIAGNOSIS — G47.26 SHIFT WORK SLEEP DISORDER: Primary | ICD-10-CM

## 2023-02-15 PROCEDURE — 99213 OFFICE O/P EST LOW 20 MIN: CPT | Mod: VID | Performed by: PHYSICIAN ASSISTANT

## 2023-02-15 RX ORDER — MODAFINIL 200 MG/1
200 TABLET ORAL DAILY
Qty: 30 TABLET | Refills: 0 | Status: SHIPPED | OUTPATIENT
Start: 2023-02-15 | End: 2023-04-18

## 2023-02-15 ASSESSMENT — SLEEP AND FATIGUE QUESTIONNAIRES
HOW LIKELY ARE YOU TO NOD OFF OR FALL ASLEEP WHILE LYING DOWN TO REST IN THE AFTERNOON WHEN CIRCUMSTANCES PERMIT: HIGH CHANCE OF DOZING
HOW LIKELY ARE YOU TO NOD OFF OR FALL ASLEEP WHILE SITTING QUIETLY AFTER LUNCH WITHOUT ALCOHOL: HIGH CHANCE OF DOZING
HOW LIKELY ARE YOU TO NOD OFF OR FALL ASLEEP WHILE SITTING INACTIVE IN A PUBLIC PLACE: HIGH CHANCE OF DOZING
HOW LIKELY ARE YOU TO NOD OFF OR FALL ASLEEP WHILE SITTING AND TALKING TO SOMEONE: MODERATE CHANCE OF DOZING
HOW LIKELY ARE YOU TO NOD OFF OR FALL ASLEEP WHILE WATCHING TV: HIGH CHANCE OF DOZING
HOW LIKELY ARE YOU TO NOD OFF OR FALL ASLEEP WHEN YOU ARE A PASSENGER IN A CAR FOR AN HOUR WITHOUT A BREAK: HIGH CHANCE OF DOZING
HOW LIKELY ARE YOU TO NOD OFF OR FALL ASLEEP WHILE SITTING AND READING: HIGH CHANCE OF DOZING
HOW LIKELY ARE YOU TO NOD OFF OR FALL ASLEEP IN A CAR, WHILE STOPPED FOR A FEW MINUTES IN TRAFFIC: SLIGHT CHANCE OF DOZING

## 2023-02-15 NOTE — PROGRESS NOTES
Does Savage have a CPAP/Bipap?  No     [place] Sleep Scale: 21    PAOLA is a 27 year old who is being evaluated via a billable video visit.      How would you like to obtain your AVS? MyChart  If the video visit is dropped, the invitation should be resent by: Text to cell phone: [phone]  Will anyone else be joining your video visit? No              Subjective   PAOLA is a 27 year old, presenting for the following health issues:  Study Results            Objective           Vitals:  No vitals were obtained today due to virtual visit.    Physical Exam   GENERAL: Healthy, alert and no distress  EYES: Eyes grossly normal to inspection.  No discharge or erythema, or obvious scleral/conjunctival abnormalities.  RESP: No audible wheeze, cough, or visible cyanosis.  No visible retractions or increased work of breathing.    SKIN: Visible skin clear. No significant rash, abnormal pigmentation or lesions.  NEURO: Cranial nerves grossly intact.  Mentation and speech appropriate for age.  PSYCH: Mentation appears normal, affect normal/bright, judgement and insight intact, normal speech and appearance well-groomed.            Video-Visit Details    Type of service:  Video Visit     Originating Location (pt. Location): Home    Distant Location (provider location):  On-site  Platform used for Video Visit: [facility]      Sleep Study Follow-Up Visit:    Date on this visit: 2/15/2023    Savage Roberts comes in today for follow-up of his sleep study done on 1/22/23 at the [facility] for excessive daytime sleepiness and possible sleep apnea.    Sleep latency 2.4 minutes without Ambien.  REM achieved.   REM latency 454.5 minutes.  Sleep efficiency 95.8%. Total sleep time 454.5 minutes.    Sleep architecture:  Stage 1, 1.2% (5%), stage 2, 66% (45-55%), stage 3, 17.3% (15-20%), stage REM, 15.5% (20-25%).  AHI was 1.5, without desaturations. RDI 6.1.  REM RDI 0.9, consistent with no REM KEIKO.  Supine  RDI 1.8, consistent with no SUPINE KEIKO.  Periodic Limb Movement Index 0.7/hour.           These findings were reviewed with patient.     Past medical/surgical history, family history, social history, medications and allergies were reviewed.      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 .   MSLT- no SOREM episodes, mean sleep latency 6.8 minutes consistent with hypersomnia.   Modafinil prescribed for possible shift work disorder. Patient had previously worked day shift and struggled even worse with sleepiness.   Given patients family history, symptoms would maintain a high index of suspicion for narcolepsy.   He will follow up with me in about 3 month(s).     Twenty-five minutes spent with patient, all of which were spent face-to-face counseling, consulting, coordinating plan of care.          CC: No Ref-Primary, Physician

## 2023-02-16 ENCOUNTER — TELEPHONE (OUTPATIENT)
Dept: SLEEP MEDICINE | Facility: CLINIC | Age: 28
End: 2023-02-16

## 2023-02-16 ENCOUNTER — TELEPHONE (OUTPATIENT)
Dept: GASTROENTEROLOGY | Facility: CLINIC | Age: 28
End: 2023-02-16

## 2023-02-16 NOTE — TELEPHONE ENCOUNTER
Prior Authorization Retail Medication Request    Medication/Dose: Modafinil 200mg   ICD code (if different than what is on RX):    Previously Tried and Failed:    Rationale:  MD CONTACT FOR PA. NON-FORMULARY DRUG, CONTACT PRESCRIBER ARMODAFINIL TAB 150MG TIER 3     Insurance Name:  [employer] part d [phone]  Insurance ID:  [id number]      Pharmacy Information (if different than what is on RX)  Name:   Phone:

## 2023-02-20 ENCOUNTER — TELEPHONE (OUTPATIENT)
Dept: GASTROENTEROLOGY | Facility: CLINIC | Age: 28
End: 2023-02-20

## 2023-02-20 ENCOUNTER — HOSPITAL ENCOUNTER (OUTPATIENT)
Facility: CLINIC | Age: 28
End: 2023-02-20
Attending: INTERNAL MEDICINE | Admitting: INTERNAL MEDICINE

## 2023-02-20 NOTE — TELEPHONE ENCOUNTER
Screening Questions  BLUE  KIND OF PREP RED  LOCATION [review exclusion criteria] GREEN  SEDATION TYPE        y Are you active on mychart?       ALEXANDRE CORONA Ordering/Referring Provider?        HP--may be changing and he will update What type of coverage do you have?      n Have you had a positive covid test in the last 14 days?     32.5 1. BMI  [BMI 40+ - review exclusion criteria]    y  2. Are you able to give consent for your medical care? [IF NO,RN REVIEW]          n  3. Are you taking any prescription pain medications on a routine schedule   (ex narcotics: oxycodone, roxicodone, oxycontin,  and percocet)? [RN Review]        n  3a. EXTENDED PREP What kind of prescription?     n 4. Do you have any chemical dependencies such as alcohol, street drugs, or methadone?        **If yes 3- 5 , please schedule with MAC sedation.**          IF YES TO ANY 6 - 10 - HOSPITAL SETTING ONLY.     n 6.   Do you need assistance transferring?     n 7.   Have you had a heart or lung transplant?    n 8.   Are you currently on dialysis?   n 9.   Do you use daily home oxygen?   n 10. Do you take nitroglycerin?   10a. n If yes, how often?     11. [FEMALES]   Are you currently pregnant?    11a.  If yes, how many weeks? [ Greater than 12 weeks, OR NEEDED]    n 12. Do you have Pulmonary Hypertension? *NEED PAC APPT AT [facility] w/ MAC*     n 13. [review exclusion criteria]  Do you have any implantable devices in your body (pacemaker, defib, LVAD)?    n 14. In the past 6 months, have you had any heart related issues including cardiomyopathy or heart attack?     14a. n If yes, did it require cardiac stenting if so when?     n 15. Have you had a stroke or Transient ischemic attack (TIA - aka  mini stroke ) within 6 months?      n 16. Do you have mod to severe Obstructive Sleep Apnea?  [Hospital only]    n 17. Do you have SEVERE AND UNCONTROLLED asthma? *NEED PAC APPT AT [facility] w/MAC*     n 18. Are you currently taking any blood thinners?     18a.  "If yes, inform patient to \"follow up w/ ordering provider for bridging instructions.\"    n 19. Do you take the medication Phentermine?    19a. If yes, \"Hold for 7 days before procedure.  Please consult your prescribing provider if you have questions about holding this medication.\"     n  20. Do you have chronic kidney disease?        21. Do you have a diagnosis of diabetes?       22. On a regular basis do you go 3-5 days between bowel movements?      23. Preferred LOCAL Pharmacy for Pre Prescription    [ LIST ONLY ONE PHARMACY]        [id number] - [place], [place] - [address] [facility] [facility] [place] [place] - [address]        - CLOSING REMINDERS -    Informed patient they will need an adult    Cannot take any type of public or medical transportation alone    Conscious Sedation- Needs  for 6 hours after the procedure       MAC/General-Needs  for 24 hours after procedure    Pre-Procedure Covid test to be completed [facility]]    Confirmed Nurse will call to complete assessment       - SCHEDULING DETAILS -  n Hospital Setting Required? If yes, what is the exclusion?:    Long  Surgeon    4/12/23  Date of Procedure  Upper Endoscopy [EGD]  Type of Procedure Scheduled  [facility]        MAC Sedation Type     n PAC / Pre-op Required                 "

## 2023-02-21 NOTE — TELEPHONE ENCOUNTER
Central Prior Authorization Team  Phone: [phone]    PA Initiation    Medication: Modafinil 200mg   Insurance Company: [employer] - Phone [phone] Fax [phone]  Pharmacy Filling the Rx: [place], [place] - [address] [place] [address]  Filling Pharmacy Phone: [phone]  Filling Pharmacy Fax:    Start Date: 2/21/2023

## 2023-02-22 NOTE — TELEPHONE ENCOUNTER
Prior Authorization Approval    Authorization Effective Date: 2/15/2023  Authorization Expiration Date: 12/31/2099  Medication: Modafinil 200mg -APPROVED  Approved Dose/Quantity:   Reference #:     Insurance Company: [employer] - Jybe [phone] Fax [phone]  Expected CoPay:       CoPay Card Available:      Foundation Assistance Needed:    Which Pharmacy is filling the prescription (Not needed for infusion/clinic administered): [facility] [place] [id number] [place] [address]  Pharmacy Notified: Yes  Patient Notified: No  **Instructed pharmacy to notify patient when script is ready to /ship.**

## 2023-03-17 NOTE — NURSING NOTE
Prior to injection verified patient identity using patient's name and date of birth.  Due to injection administration, patient instructed to remain in clinic for 15 minutes  afterwards, and to report any adverse reaction to me immediately.    Screening Questionnaire for Adult Immunization    Are you sick today?   No   Do you have allergies to medications, food, a vaccine component or latex?   No   Have you ever had a serious reaction after receiving a vaccination?   No   Do you have a long-term health problem with heart disease, lung disease, asthma, kidney disease, metabolic disease (e.g. diabetes), anemia, or other blood disorder?   No   Do you have cancer, leukemia, HIV/AIDS, or any other immune system problem?   No   In the past 3 months, have you taken medications that affect  your immune system, such as prednisone, other steroids, or anticancer drugs; drugs for the treatment of rheumatoid arthritis, Crohn s disease, or psoriasis; or have you had radiation treatments?   No   Have you had a seizure, or a brain or other nervous system problem?   No   During the past year, have you received a transfusion of blood or blood     products, or been given immune (gamma) globulin or antiviral drug?   No   For women: Are you pregnant or is there a chance you could become        pregnant during the next month?   No   Have you received any vaccinations in the past 4 weeks?   No     Immunization questionnaire answers were all negative.        Per orders of KATHRIN Claudio, injection of Tdap and Flu given by Arlene [place]. Patient instructed to remain in clinic for 15 minutes afterwards, and to report any adverse reaction to me immediately.       Screening performed by Arlene [place] on 11/30/2018 at 4:12 PM.     Chart(s) Chart(s)/Patient

## 2023-04-06 ENCOUNTER — TELEPHONE (OUTPATIENT)
Dept: GASTROENTEROLOGY | Facility: CLINIC | Age: 28
End: 2023-04-06
Payer: COMMERCIAL

## 2023-04-06 NOTE — TELEPHONE ENCOUNTER
Caller: [employer] Counselor Rep    Reason for Reschedule/Cancellation (please be detailed, any staff messages or encounters to note?): Caller reached out to cancel procedure, stating that patient does not have adequate insurance. Caller also stated that she and PCP had made several attempts to patient to inform him of cancellation. Caller stated patient can be given her call back number if he calls to inquire. Call back number is [phone].      Prior to reschedule please review:    Ordering Provider:Robert Dubon PA-C     Sedation per order:MAC    Does patient have any ASC Exclusions, please identify?: NO      Notes on Cancelled Procedure:    Procedure:Lower Endoscopy [Colonoscopy]     Date: 4/12    Location:[facility]; [address] [place] [address] , [place] [address]    Surgeon: DAVID        Rescheduled: No

## 2023-04-17 NOTE — TELEPHONE ENCOUNTER
Pending Prescriptions:                       Disp   Refills    modafinil (PROVIGIL) 200 MG tablet        30 tab*0            Sig: Take 1 tablet (200 mg) by mouth daily Take one           hour prior to work shift

## 2023-04-18 ENCOUNTER — TELEPHONE (OUTPATIENT)
Dept: GASTROENTEROLOGY | Facility: CLINIC | Age: 28
End: 2023-04-18
Payer: COMMERCIAL

## 2023-04-18 RX ORDER — MODAFINIL 200 MG/1
200 TABLET ORAL DAILY
Qty: 30 TABLET | Refills: 0 | Status: SHIPPED | OUTPATIENT
Start: 2023-04-18 | End: 2023-07-18

## 2023-04-18 NOTE — TELEPHONE ENCOUNTER
Call to pt to discuss insurance issues with EGD. Patient reports that he submitted his insurance info as requested but his EGD was cancelled anyways. He is denying black stool, reports that his hernia is what is bothering him. Writer encouraged patient to schedule his EGD again and to have labs completed soon. Patient verbalized that he will do this.    Lucy Whitehead RN

## 2023-05-02 ENCOUNTER — TELEPHONE (OUTPATIENT)
Dept: SLEEP MEDICINE | Facility: CLINIC | Age: 28
End: 2023-05-02
Payer: COMMERCIAL

## 2023-05-02 NOTE — TELEPHONE ENCOUNTER
Prior Authorization Not Needed per Insurance    Medication: modafinil (PROVIGIL) 200 MG tablet  Insurance Company: [employer] - Phone [phone] Fax [phone]  Expected CoPay:      Pharmacy Filling the Rx: [facility], [place] - [address] [place] [address]  Pharmacy Notified: Yes  Patient Notified: Yes    It appears patient has 2 different insurance plans. PA has already been approved through patient's primary plan with [employer] (see encounter dated 2/16/23). PA was denied through patient's secondary plan through [employer] (see encounter dated 4/18/23). Pharmacy billed [employer] and patient's co-pay is $4.15. If provider would like to appeal secondary insurance please use that encounter.

## 2023-05-02 NOTE — TELEPHONE ENCOUNTER
Prior Authorization Retail Medication Request    Medication/Dose: modafinil (PROVIGIL) 200 MG tablet  ICD code (if different than what is on RX):    Previously Tried and Failed:    Rationale:      Insurance Name:  [facility] OUT OF STATE  Insurance ID: [id number]       Pharmacy Information (if different than what is on RX)  Name:  [facility] [place] [facility] [address] [place] [address]  Phone: [phone]

## 2023-05-02 NOTE — TELEPHONE ENCOUNTER
General Call    Contacts       Type Contact Phone/Fax    05/02/2023 01:48 PM CDT Phone (Incoming) STEPHEN Roberts (Self) [phone] (H)        Reason for Call: Modafinil  What are your questions or concerns:  Stephen is trying to get medication refilled. Insurance is requesting a 90 day history which there is none.  Stephen is unsure if he should make an appointment to see provider. Or if there is away provider could please assist in getting this medication due the wonderful results Stephen sees in medication.    Date of last appointment with provider: 2/15/2023    Could we send this information to you in [facility] or would you prefer to receive a phone call?:   Patient would prefer a phone call   Okay to leave a detailed message?: Yes at Home number on file [phone] (home)

## 2023-05-02 NOTE — TELEPHONE ENCOUNTER
Writer called [facility] pharmacy to see if a prior auth is needed for this mediation and pharmacy staff said they will fax a PA request to the [facility].    Angel Luis GALAVIZ CMA

## 2023-05-03 NOTE — TELEPHONE ENCOUNTER
Writer contacted the patient's pharmacy to confirm that the medication has been approved and it is ready for . Pharmacy staff confirmed this and writer left VM for patient to check with his pharmacy.     Angel Luis GALAVIZ CMA

## 2023-06-02 ENCOUNTER — HEALTH MAINTENANCE LETTER (OUTPATIENT)
Age: 28
End: 2023-06-02

## 2023-07-18 ENCOUNTER — TELEPHONE (OUTPATIENT)
Dept: SLEEP MEDICINE | Facility: CLINIC | Age: 28
End: 2023-07-18
Payer: COMMERCIAL

## 2023-07-18 RX ORDER — MODAFINIL 200 MG/1
200 TABLET ORAL DAILY
Qty: 30 TABLET | Refills: 0 | Status: SHIPPED | OUTPATIENT
Start: 2023-07-18 | End: 2023-09-27

## 2023-07-18 RX ORDER — MODAFINIL 200 MG/1
200 TABLET ORAL DAILY
Qty: 30 TABLET | Refills: 0 | Status: CANCELLED | OUTPATIENT
Start: 2023-07-18

## 2023-07-18 NOTE — TELEPHONE ENCOUNTER
"Patient called and left a VM concerned about the status of his refill request that was received 7/18/23 by fax from his pharmacy.   Patient states that he requested this medication last Thursday (his provider is out Thursdays) Patient was very frustrated that his modafinil (PROVIGIL) 200 MG tablet has not been refilled yet and states that he has never been treated so poorly and he wants his medication before he dies from \"f*@#&ing\" narcolepsy. He was cursing the majority of the message.   Writer returned a call to the patient and left a voice messaging stating that we just received the request from the pharmacy today and going forward he should check with the pharmacy first to be sure they send it to the right fax number and that using abusive language gets documented in his chart and he could potentially be released from care so he is really doing himself a disservice by speaking to us in such a manor and that his medication is a controlled substance which is why he is getting 30 days at a time. We don't make those rules. I did empathize with his frustration but again reiterated that we need to act appropriately and abusive language will not be tolerated. I left my direct number in case there was anything else I could assist him with.    Angel Luis SUGGS CMA   "

## 2023-09-27 DIAGNOSIS — K44.9 HIATAL HERNIA: ICD-10-CM

## 2023-09-27 DIAGNOSIS — M10.9 GOUT OF MULTIPLE SITES, UNSPECIFIED CAUSE, UNSPECIFIED CHRONICITY: ICD-10-CM

## 2023-09-27 RX ORDER — MODAFINIL 200 MG/1
200 TABLET ORAL DAILY
Qty: 30 TABLET | Refills: 0 | Status: SHIPPED | OUTPATIENT
Start: 2023-09-27 | End: 2023-11-10

## 2023-09-27 NOTE — TELEPHONE ENCOUNTER
Pending Prescriptions:                       Disp   Refills    modafinil (PROVIGIL) 200 MG tablet        30 tab*0            Sig: Take 1 tablet (200 mg) by mouth daily Take one           hour prior to work shift       Last fill date:7/18/23    Last visit: 2/15/23    No future appts scheduled at this time.    Angel Luis GALAVIZ CMA

## 2023-09-28 RX ORDER — ALLOPURINOL 100 MG/1
100 TABLET ORAL DAILY
Qty: 30 TABLET | Refills: 0 | OUTPATIENT
Start: 2023-09-28

## 2023-09-28 NOTE — TELEPHONE ENCOUNTER
"Spoke to pt. Did not want to schedule appt due to \"not having money\". Understood he would not be getting the refill and hung up on writer.  Neelam Gabriel on 9/28/2023 at 3:38 PM    "

## 2023-09-28 NOTE — TELEPHONE ENCOUNTER
Eye Problem(s):negative and glasses or contacts  ENT Problem(s):negative  Cardiovascular problem(s):negative  Respiratory problem(s):negative  Gastro-intestinal problem(s):abdominal pain  Genito-urinary problem(s):negative  Musculoskeletal problem(s):negative  Integumentary problem(s):negative  Neurological problem(s):negative  Psychiatric problem(s):negative  Endocrine problem(s):negative  Hematologic and/or Lymphatic problem(s):negative     Overdue for needed care. Please call to schedule.     Alem Krishnan MSN, RN

## 2023-11-10 NOTE — TELEPHONE ENCOUNTER
Modafinil 200mg      Last Written Prescription Date:  9/27/23  Last Fill Quantity: 30,   # refills: 0  Last Office Visit: 2/15/23  Future Office visit:       Routing refill request to provider for review/approval because:  Drug not on the [facility], [facility] or [facility] refill protocol or controlled substance

## 2023-11-13 RX ORDER — MODAFINIL 200 MG/1
200 TABLET ORAL DAILY
Qty: 30 TABLET | Refills: 0 | Status: SHIPPED | OUTPATIENT
Start: 2023-11-13 | End: 2023-12-26

## 2023-12-26 NOTE — TELEPHONE ENCOUNTER
Pending Prescriptions:                       Disp   Refills    modafinil (PROVIGIL) 200 MG tablet        30 tab*0            Sig: Take 1 tablet (200 mg) by mouth daily Take one           hour prior to work shift       Last office visit:2/15/23  No future visits scheduled at this time. Writer did leave the patient a VM to call and schedule with provider.    Angel Luis GALAVIZ CMA

## 2023-12-26 NOTE — TELEPHONE ENCOUNTER
Pending Prescriptions:                       Disp   Refills    modafinil (PROVIGIL) 200 MG tablet        30 tab*0            Sig: Take 1 tablet (200 mg) by mouth daily Take one           hour prior to work shift          Last Written Prescription Date:  11/13/23  Last Fill Quantity: 30,   # refills: 0  Last Office Visit: 2/15/23  Future Office visit:   none scheduled    Routing refill request to provider for review/approval because:  Drug not on the [facility], [facility] or [facility] refill protocol or controlled substance

## 2024-01-02 RX ORDER — MODAFINIL 200 MG/1
200 TABLET ORAL DAILY
Qty: 30 TABLET | Refills: 0 | Status: SHIPPED | OUTPATIENT
Start: 2024-01-02

## 2024-06-22 ENCOUNTER — HEALTH MAINTENANCE LETTER (OUTPATIENT)
Age: 29
End: 2024-06-22

## 2025-07-12 ENCOUNTER — HEALTH MAINTENANCE LETTER (OUTPATIENT)
Age: 30
End: 2025-07-12